# Patient Record
Sex: FEMALE | Race: WHITE | NOT HISPANIC OR LATINO
[De-identification: names, ages, dates, MRNs, and addresses within clinical notes are randomized per-mention and may not be internally consistent; named-entity substitution may affect disease eponyms.]

---

## 2017-04-05 ENCOUNTER — APPOINTMENT (OUTPATIENT)
Dept: ORTHOPEDIC SURGERY | Facility: CLINIC | Age: 67
End: 2017-04-05

## 2017-04-05 DIAGNOSIS — G14 POSTPOLIO SYNDROME: ICD-10-CM

## 2017-04-12 ENCOUNTER — RX RENEWAL (OUTPATIENT)
Age: 67
End: 2017-04-12

## 2017-10-24 ENCOUNTER — APPOINTMENT (OUTPATIENT)
Dept: OPHTHALMOLOGY | Facility: CLINIC | Age: 67
End: 2017-10-24
Payer: MEDICARE

## 2017-10-24 DIAGNOSIS — H52.4 PRESBYOPIA: ICD-10-CM

## 2017-10-24 DIAGNOSIS — H35.30 UNSPECIFIED MACULAR DEGENERATION: ICD-10-CM

## 2017-10-24 DIAGNOSIS — H52.203 UNSPECIFIED ASTIGMATISM, BILATERAL: ICD-10-CM

## 2017-10-24 DIAGNOSIS — H52.13 MYOPIA, BILATERAL: ICD-10-CM

## 2017-10-24 PROCEDURE — 76514 ECHO EXAM OF EYE THICKNESS: CPT

## 2017-10-24 PROCEDURE — 92136 OPHTHALMIC BIOMETRY: CPT

## 2017-10-24 PROCEDURE — 99204 OFFICE O/P NEW MOD 45 MIN: CPT

## 2017-10-24 PROCEDURE — 92134 CPTRZ OPH DX IMG PST SGM RTA: CPT

## 2017-11-03 NOTE — ASU PATIENT PROFILE, ADULT - VISION (WITH CORRECTIVE LENSES IF THE PATIENT USUALLY WEARS THEM):
Left eye blurry/Partially impaired: cannot see medication labels or newsprint, but can see obstacles in path, and the surrounding layout; can count fingers at arm's length

## 2017-11-03 NOTE — ASU PATIENT PROFILE, ADULT - PMH
Diverticulosis    Dyslipidemia    h/o Polio in childhood  RLE weakness, uses cane to ambulate  HTN (hypertension)    Hypothyroidism    Obesity

## 2017-11-03 NOTE — ASU PATIENT PROFILE, ADULT - PSH
H/O lumbar L5-L6 laminectomy  2003  H/O partial thyroidectomy    S/P bilateral salpingo-oophorectomy    secondary to ovarian cyst  S/P  Section  ,   S/P D&C  2004  s/p right knee arthroscopy for meniscus tear  ,   surgical repair of left leg growth plate  secondary to polio    surgical repair of right foot  secondary to polio  1960  umbilical Hernia repair with mesh  2010 H/O lumbar L5-L6 laminectomy  2003  H/O partial thyroidectomy    S/P bilateral salpingo-oophorectomy    secondary to ovarian cyst  S/P  Section  ,   S/P D&C  2004  s/p right knee arthroscopy for meniscus tear  ,   S/P TKR (total knee replacement)  Left  surgical repair of left leg growth plate  secondary to polio    surgical repair of right foot  secondary to polio  1960  umbilical Hernia repair with mesh  2010

## 2017-11-06 ENCOUNTER — OUTPATIENT (OUTPATIENT)
Dept: OUTPATIENT SERVICES | Facility: HOSPITAL | Age: 67
LOS: 1 days | End: 2017-11-06
Payer: MEDICARE

## 2017-11-06 ENCOUNTER — APPOINTMENT (OUTPATIENT)
Dept: OPHTHALMOLOGY | Facility: HOSPITAL | Age: 67
End: 2017-11-06
Payer: MEDICARE

## 2017-11-06 ENCOUNTER — TRANSCRIPTION ENCOUNTER (OUTPATIENT)
Age: 67
End: 2017-11-06

## 2017-11-06 VITALS
HEIGHT: 64 IN | DIASTOLIC BLOOD PRESSURE: 69 MMHG | HEART RATE: 88 BPM | WEIGHT: 213.41 LBS | RESPIRATION RATE: 19 BRPM | SYSTOLIC BLOOD PRESSURE: 131 MMHG | TEMPERATURE: 98 F

## 2017-11-06 VITALS
HEART RATE: 80 BPM | OXYGEN SATURATION: 96 % | DIASTOLIC BLOOD PRESSURE: 56 MMHG | SYSTOLIC BLOOD PRESSURE: 110 MMHG | RESPIRATION RATE: 17 BRPM

## 2017-11-06 DIAGNOSIS — H25.812 COMBINED FORMS OF AGE-RELATED CATARACT, LEFT EYE: ICD-10-CM

## 2017-11-06 DIAGNOSIS — Z96.659 PRESENCE OF UNSPECIFIED ARTIFICIAL KNEE JOINT: Chronic | ICD-10-CM

## 2017-11-06 PROCEDURE — 66984 XCAPSL CTRC RMVL W/O ECP: CPT | Mod: LT

## 2017-11-06 PROCEDURE — C1780: CPT

## 2017-11-06 NOTE — ASU DISCHARGE PLAN (ADULT/PEDIATRIC). - PT EDUC
Intraocular lens implant (IOL) , Eye shield instructions and eye kit given to patient/other (specify)/Implant card (specify)

## 2017-11-06 NOTE — ASU DISCHARGE PLAN (ADULT/PEDIATRIC). - SPECIAL INSTRUCTIONS
Remove patch after 3 hours.  Throw away the white pad underneath the clear shield.  Start the following DURING THE DAY ONLY (not at night when sleeping):   Ofloxacin 1 drop every 4 hours,   Ketorolac 1 drop 2 times a day, and   Prednisolone acetate (shake well before using!) 1 drop every 2 hours.    Wear clear shield when napping or sleeping at night.  Do not rub eye as there are no stitches in the eye!  No showering tonight.    Please call [phone] if you have any questions or if you have pain or vomiting despite taking Tylenol (after 4:30 PM-9am).  Please call [phone] or [phone] during the day (9am-4:30PM) if you have any questions or concerns or pain or vomiting despite taking Tylenol.

## 2017-11-07 ENCOUNTER — APPOINTMENT (OUTPATIENT)
Dept: OPHTHALMOLOGY | Facility: CLINIC | Age: 67
End: 2017-11-07
Payer: MEDICARE

## 2017-11-07 PROCEDURE — 99024 POSTOP FOLLOW-UP VISIT: CPT

## 2017-11-16 ENCOUNTER — APPOINTMENT (OUTPATIENT)
Dept: OPHTHALMOLOGY | Facility: CLINIC | Age: 67
End: 2017-11-16
Payer: MEDICARE

## 2017-11-16 PROCEDURE — 99024 POSTOP FOLLOW-UP VISIT: CPT

## 2017-11-30 ENCOUNTER — APPOINTMENT (OUTPATIENT)
Dept: OPHTHALMOLOGY | Facility: CLINIC | Age: 67
End: 2017-11-30
Payer: MEDICARE

## 2017-11-30 DIAGNOSIS — H26.492 OTHER SECONDARY CATARACT, LEFT EYE: ICD-10-CM

## 2017-11-30 PROCEDURE — 92134 CPTRZ OPH DX IMG PST SGM RTA: CPT

## 2017-11-30 PROCEDURE — 99024 POSTOP FOLLOW-UP VISIT: CPT

## 2018-01-26 ENCOUNTER — APPOINTMENT (OUTPATIENT)
Dept: OPHTHALMOLOGY | Facility: CLINIC | Age: 68
End: 2018-01-26
Payer: MEDICARE

## 2018-01-26 DIAGNOSIS — H25.12 AGE-RELATED NUCLEAR CATARACT, LEFT EYE: ICD-10-CM

## 2018-01-26 DIAGNOSIS — H35.30 UNSPECIFIED MACULAR DEGENERATION: ICD-10-CM

## 2018-01-26 PROCEDURE — 92134 CPTRZ OPH DX IMG PST SGM RTA: CPT

## 2018-01-26 PROCEDURE — 99024 POSTOP FOLLOW-UP VISIT: CPT

## 2018-01-26 PROCEDURE — 92015 DETERMINE REFRACTIVE STATE: CPT

## 2018-04-05 ENCOUNTER — APPOINTMENT (OUTPATIENT)
Dept: OBGYN | Facility: CLINIC | Age: 68
End: 2018-04-05
Payer: MEDICARE

## 2018-04-05 VITALS
HEIGHT: 63 IN | BODY MASS INDEX: 37.03 KG/M2 | WEIGHT: 209 LBS | SYSTOLIC BLOOD PRESSURE: 131 MMHG | DIASTOLIC BLOOD PRESSURE: 76 MMHG | HEART RATE: 78 BPM

## 2018-04-05 DIAGNOSIS — R92.2 INCONCLUSIVE MAMMOGRAM: ICD-10-CM

## 2018-04-05 PROCEDURE — 99397 PER PM REEVAL EST PAT 65+ YR: CPT

## 2018-04-10 ENCOUNTER — APPOINTMENT (OUTPATIENT)
Dept: ORTHOPEDIC SURGERY | Facility: CLINIC | Age: 68
End: 2018-04-10
Payer: MEDICARE

## 2018-04-10 PROCEDURE — 99213 OFFICE O/P EST LOW 20 MIN: CPT

## 2018-04-10 PROCEDURE — 73562 X-RAY EXAM OF KNEE 3: CPT | Mod: 50

## 2018-05-04 ENCOUNTER — OUTPATIENT (OUTPATIENT)
Dept: OUTPATIENT SERVICES | Facility: HOSPITAL | Age: 68
LOS: 1 days | End: 2018-05-04
Payer: MEDICARE

## 2018-05-04 ENCOUNTER — RESULT REVIEW (OUTPATIENT)
Age: 68
End: 2018-05-04

## 2018-05-04 DIAGNOSIS — Z96.659 PRESENCE OF UNSPECIFIED ARTIFICIAL KNEE JOINT: Chronic | ICD-10-CM

## 2018-05-04 DIAGNOSIS — K86.9 DISEASE OF PANCREAS, UNSPECIFIED: ICD-10-CM

## 2018-05-04 PROCEDURE — 43259 EGD US EXAM DUODENUM/JEJUNUM: CPT

## 2018-05-04 PROCEDURE — 43239 EGD BIOPSY SINGLE/MULTIPLE: CPT | Mod: XS

## 2018-05-04 PROCEDURE — 88305 TISSUE EXAM BY PATHOLOGIST: CPT | Mod: 26

## 2018-05-04 PROCEDURE — 88305 TISSUE EXAM BY PATHOLOGIST: CPT

## 2018-05-07 LAB — SURGICAL PATHOLOGY STUDY: SIGNIFICANT CHANGE UP

## 2018-07-26 ENCOUNTER — APPOINTMENT (OUTPATIENT)
Dept: OPHTHALMOLOGY | Facility: CLINIC | Age: 68
End: 2018-07-26
Payer: MEDICARE

## 2018-07-26 DIAGNOSIS — H40.003 PREGLAUCOMA, UNSPECIFIED, BILATERAL: ICD-10-CM

## 2018-07-26 PROCEDURE — 92012 INTRM OPH EXAM EST PATIENT: CPT

## 2018-07-26 PROCEDURE — 92250 FUNDUS PHOTOGRAPHY W/I&R: CPT

## 2018-07-26 PROCEDURE — 92083 EXTENDED VISUAL FIELD XM: CPT

## 2019-04-08 ENCOUNTER — APPOINTMENT (OUTPATIENT)
Dept: OBGYN | Facility: CLINIC | Age: 69
End: 2019-04-08
Payer: COMMERCIAL

## 2019-04-08 VITALS
SYSTOLIC BLOOD PRESSURE: 147 MMHG | DIASTOLIC BLOOD PRESSURE: 80 MMHG | WEIGHT: 195 LBS | BODY MASS INDEX: 34.55 KG/M2 | HEIGHT: 63 IN | HEART RATE: 84 BPM

## 2019-04-08 DIAGNOSIS — N63.0 UNSPECIFIED LUMP IN UNSPECIFIED BREAST: ICD-10-CM

## 2019-04-08 PROCEDURE — 99397 PER PM REEVAL EST PAT 65+ YR: CPT

## 2019-04-10 ENCOUNTER — APPOINTMENT (OUTPATIENT)
Dept: ORTHOPEDIC SURGERY | Facility: CLINIC | Age: 69
End: 2019-04-10
Payer: COMMERCIAL

## 2019-04-10 VITALS
DIASTOLIC BLOOD PRESSURE: 85 MMHG | HEART RATE: 65 BPM | SYSTOLIC BLOOD PRESSURE: 144 MMHG | WEIGHT: 195 LBS | BODY MASS INDEX: 34.55 KG/M2 | HEIGHT: 63 IN

## 2019-04-10 PROCEDURE — 99213 OFFICE O/P EST LOW 20 MIN: CPT

## 2019-04-10 PROCEDURE — 73562 X-RAY EXAM OF KNEE 3: CPT | Mod: LT

## 2019-07-19 ENCOUNTER — RESULT REVIEW (OUTPATIENT)
Age: 69
End: 2019-07-19

## 2019-07-19 ENCOUNTER — OUTPATIENT (OUTPATIENT)
Dept: OUTPATIENT SERVICES | Facility: HOSPITAL | Age: 69
LOS: 1 days | End: 2019-07-19
Payer: MEDICARE

## 2019-07-19 DIAGNOSIS — K86.3 PSEUDOCYST OF PANCREAS: ICD-10-CM

## 2019-07-19 DIAGNOSIS — Z96.659 PRESENCE OF UNSPECIFIED ARTIFICIAL KNEE JOINT: Chronic | ICD-10-CM

## 2019-07-19 LAB — AMYLASE FLD-CCNC: 1388 U/L — SIGNIFICANT CHANGE UP

## 2019-07-19 PROCEDURE — 82150 ASSAY OF AMYLASE: CPT

## 2019-07-19 PROCEDURE — 43242 EGD US FINE NEEDLE BX/ASPIR: CPT

## 2019-07-19 PROCEDURE — 82378 CARCINOEMBRYONIC ANTIGEN: CPT

## 2019-07-19 PROCEDURE — 88305 TISSUE EXAM BY PATHOLOGIST: CPT | Mod: 26

## 2019-07-19 PROCEDURE — 88305 TISSUE EXAM BY PATHOLOGIST: CPT

## 2019-07-19 PROCEDURE — 88173 CYTOPATH EVAL FNA REPORT: CPT | Mod: 26

## 2019-07-19 PROCEDURE — 88173 CYTOPATH EVAL FNA REPORT: CPT

## 2019-07-22 LAB
CEA FLD-MCNC: 14 NG/ML — SIGNIFICANT CHANGE UP
NON-GYNECOLOGICAL CYTOLOGY STUDY: SIGNIFICANT CHANGE UP
SPECIMEN SOURCE FLD: SIGNIFICANT CHANGE UP

## 2020-04-13 ENCOUNTER — APPOINTMENT (OUTPATIENT)
Dept: OBGYN | Facility: CLINIC | Age: 70
End: 2020-04-13

## 2020-06-22 ENCOUNTER — APPOINTMENT (OUTPATIENT)
Dept: OBGYN | Facility: CLINIC | Age: 70
End: 2020-06-22
Payer: MEDICARE

## 2020-06-22 VITALS
SYSTOLIC BLOOD PRESSURE: 132 MMHG | WEIGHT: 207.13 LBS | DIASTOLIC BLOOD PRESSURE: 82 MMHG | TEMPERATURE: 97.2 F | HEART RATE: 79 BPM | HEIGHT: 63 IN | BODY MASS INDEX: 36.7 KG/M2

## 2020-06-22 DIAGNOSIS — M85.80 OTHER SPECIFIED DISORDERS OF BONE DENSITY AND STRUCTURE, UNSPECIFIED SITE: ICD-10-CM

## 2020-06-22 DIAGNOSIS — N95.2 POSTMENOPAUSAL ATROPHIC VAGINITIS: ICD-10-CM

## 2020-06-22 DIAGNOSIS — R92.8 OTHER ABNORMAL AND INCONCLUSIVE FINDINGS ON DIAGNOSTIC IMAGING OF BREAST: ICD-10-CM

## 2020-06-22 PROCEDURE — 99397 PER PM REEVAL EST PAT 65+ YR: CPT

## 2020-06-22 NOTE — PHYSICAL EXAM
[Awake] : awake [Alert] : alert [Soft] : soft [Oriented x3] : oriented to person, place, and time [Vulvar Atrophy] : vulvar atrophy [Normal] : uterus [No Bleeding] : there was no active vaginal bleeding [Uterine Adnexae] : were not tender and not enlarged [Acute Distress] : no acute distress [Mass] : no breast mass [Nipple Discharge] : no nipple discharge [Axillary LAD] : no axillary lymphadenopathy [Tender] : non tender

## 2020-07-02 ENCOUNTER — TRANSCRIPTION ENCOUNTER (OUTPATIENT)
Age: 70
End: 2020-07-02

## 2020-07-08 ENCOUNTER — APPOINTMENT (OUTPATIENT)
Dept: ORTHOPEDIC SURGERY | Facility: CLINIC | Age: 70
End: 2020-07-08
Payer: MEDICARE

## 2020-07-08 VITALS — TEMPERATURE: 96.5 F

## 2020-07-08 DIAGNOSIS — A80.9 ACUTE POLIOMYELITIS, UNSPECIFIED: ICD-10-CM

## 2020-07-08 PROCEDURE — 73560 X-RAY EXAM OF KNEE 1 OR 2: CPT | Mod: RT

## 2020-07-08 PROCEDURE — 99213 OFFICE O/P EST LOW 20 MIN: CPT

## 2020-07-08 PROCEDURE — 73562 X-RAY EXAM OF KNEE 3: CPT | Mod: LT

## 2020-07-08 NOTE — HISTORY OF PRESENT ILLNESS
[de-identified] : 70 y/o female s/p L TKR 10/8/12 doing well.\par No pain or complaints about the left knee.\par She is managing well with her day-to-day activities.

## 2020-07-08 NOTE — DISCUSSION/SUMMARY
[de-identified] : Her total knee replacement is doing well.  She is managing very well with her right knee.  Return visit in 1 to 2 years.

## 2020-07-08 NOTE — PHYSICAL EXAM
[FreeTextEntry2] : Total knee replacement continues to do extremely well.  She has full extension she can flex it to about 115 degrees she has good medial lateral and posterior stability.  Her opposite right knee is on her polio leg here she has full extension and flexes 120 degrees she does have slight medial lateral jog of motion she however is managing at this time well and she would definitely like to stay on conservative measures.  \par \par  [de-identified] : Today this patient had 3 views done of her left total knee replacement and an AP was done of the right knee.  Her left total knee replacement shows excellent alignment position there is no evidence of loosening or ostial lysis and the patella tracks well.\par \par Her right knee is satisfactory aligned the patient does have polio affecting this leg this can be seen with the slightly smaller patella and she does have some medial lateral degenerative changes in the compartments but she is managing satisfactorily.

## 2020-11-03 ENCOUNTER — APPOINTMENT (OUTPATIENT)
Dept: THORACIC SURGERY | Facility: CLINIC | Age: 70
End: 2020-11-03
Payer: MEDICARE

## 2020-11-03 VITALS
HEIGHT: 63 IN | DIASTOLIC BLOOD PRESSURE: 85 MMHG | OXYGEN SATURATION: 98 % | HEART RATE: 89 BPM | WEIGHT: 200 LBS | SYSTOLIC BLOOD PRESSURE: 152 MMHG | BODY MASS INDEX: 35.44 KG/M2

## 2020-11-03 PROCEDURE — 99205 OFFICE O/P NEW HI 60 MIN: CPT

## 2020-11-03 PROCEDURE — 99072 ADDL SUPL MATRL&STAF TM PHE: CPT

## 2020-11-03 RX ORDER — DORZOLAMIDE HYDROCHLORIDE 20 MG/ML
2 SOLUTION OPHTHALMIC
Refills: 0 | Status: DISCONTINUED | COMMUNITY
End: 2020-11-03

## 2020-11-03 RX ORDER — OFLOXACIN 3 MG/ML
0.3 SOLUTION/ DROPS OPHTHALMIC 4 TIMES DAILY
Qty: 1 | Refills: 5 | Status: DISCONTINUED | COMMUNITY
Start: 2017-10-31 | End: 2020-11-03

## 2020-11-03 RX ORDER — VALSARTAN 80 MG/1
80 TABLET, COATED ORAL
Qty: 90 | Refills: 0 | Status: DISCONTINUED | COMMUNITY
Start: 2017-01-04 | End: 2020-11-03

## 2020-11-03 RX ORDER — VALSARTAN AND HYDROCHLOROTHIAZIDE 160; 25 MG/1; MG/1
160-25 TABLET, FILM COATED ORAL
Qty: 90 | Refills: 0 | Status: DISCONTINUED | COMMUNITY
Start: 2017-01-04 | End: 2020-11-03

## 2020-11-03 RX ORDER — PREDNISOLONE ACETATE 10 MG/ML
1 SUSPENSION/ DROPS OPHTHALMIC
Qty: 1 | Refills: 3 | Status: DISCONTINUED | COMMUNITY
Start: 2017-10-31 | End: 2020-11-03

## 2020-11-03 NOTE — HISTORY OF PRESENT ILLNESS
[FreeTextEntry1] : Ms. Gaona is a 69 year old female who presents today for evaluation, referred by pulmonologist Dr. Arslan Larios. She is a former smoker (20 years, 1.5 PPD, Quit 1992) w/ hx of HTN, poliomyelitis, and partial thyroidectomy.  She was found to have lung nodules on CT Abdomen during work-up for diverticulitis many years ago, which have been followed by serial CT Chest scans with Dr. Denis Soriano.  Of note, due to a recent insurance plan change, she has changed pulmonologist and now follows with Dr. Larios. \par \par CT Chest on 09/04/2020 reveals: \par - Left apical 2.1 x 1.2 cm semisolid nodule, increased in size since prior study\par - Adjacent 6 mm semisolid nodule (4: 26), unchanged\par - RML 0.8 cm groundglass nodule (4: 55), unchanged\par - LLL 5 mm nodule (4: 77), also unchanged\par - Additional scattered bilateral groundglass nodules\par - 1.5 cm (3: 56) low-attenuation lesion in the pancreatic head \par \par PFTs on 10/23/2020 reveals:\par - FEV1: 2.38 (113%)\par - DLCO: 22.23 (109%)\par \par She denies any fever, chills, cough, shortness of breath, chest pain, hemoptysis, or recent illness. Of note, she uses an assistive device for walking due to history of polio.

## 2020-11-03 NOTE — PHYSICAL EXAM
[General Appearance - Alert] : alert [General Appearance - In No Acute Distress] : in no acute distress [Sclera] : the sclera and conjunctiva were normal [Outer Ear] : the ears and nose were normal in appearance [Hearing Threshold Finger Rub Not [place]] : hearing was normal [Neck Appearance] : the appearance of the neck was normal [] : no respiratory distress [Respiration, Rhythm And Depth] : normal respiratory rhythm and effort [Auscultation Breath Sounds / Voice Sounds] : lungs were clear to auscultation bilaterally [Heart Sounds] : normal S1 and S2 [Murmurs] : no murmurs [Examination Of The Chest] : the chest was normal in appearance [Abdomen Soft] : soft [Abdomen Tenderness] : non-tender [Cervical Lymph Nodes Enlarged Posterior Bilaterally] : posterior cervical [Cervical Lymph Nodes Enlarged Anterior Bilaterally] : anterior cervical [Supraclavicular Lymph Nodes Enlarged Bilaterally] : supraclavicular [Skin Color & Pigmentation] : normal skin color and pigmentation [Skin Turgor] : normal skin turgor [No Focal Deficits] : no focal deficits [Oriented To Time, Place, And Person] : oriented to person, place, and time [Affect] : the affect was normal [Mood] : the mood was normal [FreeTextEntry1] : walks with one crutch

## 2020-11-03 NOTE — CONSULT LETTER
[Consult Letter:] : I had the pleasure of evaluating your patient, [unfilled]. [( Thank you for referring [unfilled] for consultation for _____ )] : Thank you for referring [unfilled] for consultation for [unfilled] [Please see my note below.] : Please see my note below. [Consult Closing:] : Thank you very much for allowing me to participate in the care of this patient.  If you have any questions, please do not hesitate to contact me. [Sincerely,] : Sincerely, [Dear  ___] : Dear  [unfilled], [FreeTextEntry2] : Dr. Arslan Larios (Pulm/Ref) [FreeTextEntry3] : North Salmon MD, FACS \par Chief, Division of Thoracic Surgery \par Director, Minimally Invasive Thoracic Surgery \par Department of Cardiovascular and Thoracic Surgery \par [facility] \par , Cardiovascular and Thoracic Surgery\par \par

## 2020-11-03 NOTE — ASSESSMENT
[FreeTextEntry1] : 69 year old female, evaluation of lung nodule, ref: Dr. Larios. Former smoker, found to have lung nodules on CT Abd which have been followed by serial CT Chest scans over the last few years.\par \par CT Chest on 09/04/2020 reveals: \par - Left apical 2.1 x 1.2 cm semisolid nodule, increased in size since prior study\par - Adjacent 6 mm semisolid nodule (4: 26), unchanged\par - RML 0.8 cm groundglass nodule (4: 55), unchanged\par - LLL 5 mm nodule (4: 77), also unchanged\par - Additional scattered bilateral groundglass nodules\par - 1.5 cm (3: 56) low-attenuation lesion in the pancreatic head \par \par PFTs on 10/23/2020 reveals: FEV1: 2.38 (113%), DLCO: 22.23 (109%).\par \par I have reviewed the patient's medical records and diagnostic images during the time of this office visit, and I have made the following recommendation:\par 1.  Flexible Bronchoscopy, Left VATS, Lung Resection\par 2.  Patient enrolled in clinical trial [facility] STUDY with [facility] 3. Consent in the chart.\par 3.  The risks, benefits, and alternatives to the procedure were discussed with the patient at length. She verbalized understanding and is in agreement with the above treatment plan.\par 4.  Prior to the above procedure, I have asked her to obtain cardiac clearance.\par \par \par I personally performed the services described in the documentation, reviewed the documentation recorded by the scribe in my presence and it accurately and completely records my words and actions.\par \par I, Marilee Hummel, am scribing for and the presence of ALDEN Cast the following sections HISTORY OF PRESENT ILLNESSES, PAST MEDICAL/FAMILY/SOCIAL HISTORY; REVIEW OF SYSTEMS; VITAL SIGNS; PHYSICAL EXAM; DISPOSITION.

## 2020-11-11 RX ORDER — KETOROLAC TROMETHAMINE 4 MG/ML
0.4 SOLUTION/ DROPS OPHTHALMIC TWICE DAILY
Qty: 1 | Refills: 3 | Status: DISCONTINUED | COMMUNITY
Start: 2017-10-31 | End: 2020-11-11

## 2020-11-11 RX ORDER — LOSARTAN POTASSIUM AND HYDROCHLOROTHIAZIDE 25; 100 MG/1; MG/1
100-25 TABLET ORAL DAILY
Refills: 0 | Status: ACTIVE | COMMUNITY

## 2020-11-11 RX ORDER — CALCIUM CARBONATE/VITAMIN D3 600 MG-10
TABLET ORAL DAILY
Refills: 0 | Status: ACTIVE | COMMUNITY

## 2020-11-18 ENCOUNTER — OUTPATIENT (OUTPATIENT)
Dept: OUTPATIENT SERVICES | Facility: HOSPITAL | Age: 70
LOS: 1 days | End: 2020-11-18
Payer: MEDICARE

## 2020-11-18 ENCOUNTER — APPOINTMENT (OUTPATIENT)
Dept: CARDIOLOGY | Facility: CLINIC | Age: 70
End: 2020-11-18
Payer: MEDICARE

## 2020-11-18 ENCOUNTER — NON-APPOINTMENT (OUTPATIENT)
Age: 70
End: 2020-11-18

## 2020-11-18 ENCOUNTER — APPOINTMENT (OUTPATIENT)
Dept: CV DIAGNOSITCS | Facility: HOSPITAL | Age: 70
End: 2020-11-18

## 2020-11-18 VITALS
TEMPERATURE: 98 F | DIASTOLIC BLOOD PRESSURE: 83 MMHG | RESPIRATION RATE: 16 BRPM | BODY MASS INDEX: 37.38 KG/M2 | HEART RATE: 91 BPM | SYSTOLIC BLOOD PRESSURE: 149 MMHG | WEIGHT: 211 LBS | OXYGEN SATURATION: 98 %

## 2020-11-18 DIAGNOSIS — Z96.659 PRESENCE OF UNSPECIFIED ARTIFICIAL KNEE JOINT: Chronic | ICD-10-CM

## 2020-11-18 DIAGNOSIS — R06.00 DYSPNEA, UNSPECIFIED: ICD-10-CM

## 2020-11-18 PROCEDURE — 93306 TTE W/DOPPLER COMPLETE: CPT | Mod: 26

## 2020-11-18 PROCEDURE — 99205 OFFICE O/P NEW HI 60 MIN: CPT

## 2020-11-18 PROCEDURE — 93000 ELECTROCARDIOGRAM COMPLETE: CPT

## 2020-11-23 ENCOUNTER — APPOINTMENT (OUTPATIENT)
Dept: INTERNAL MEDICINE | Facility: CLINIC | Age: 70
End: 2020-11-23

## 2020-11-25 ENCOUNTER — OUTPATIENT (OUTPATIENT)
Dept: OUTPATIENT SERVICES | Facility: HOSPITAL | Age: 70
LOS: 1 days | End: 2020-11-25

## 2020-11-25 VITALS
HEART RATE: 83 BPM | OXYGEN SATURATION: 99 % | RESPIRATION RATE: 16 BRPM | HEIGHT: 63 IN | DIASTOLIC BLOOD PRESSURE: 80 MMHG | TEMPERATURE: 98 F | SYSTOLIC BLOOD PRESSURE: 158 MMHG | WEIGHT: 210.1 LBS

## 2020-11-25 DIAGNOSIS — I10 ESSENTIAL (PRIMARY) HYPERTENSION: ICD-10-CM

## 2020-11-25 DIAGNOSIS — R91.1 SOLITARY PULMONARY NODULE: ICD-10-CM

## 2020-11-25 DIAGNOSIS — Z98.49 CATARACT EXTRACTION STATUS, UNSPECIFIED EYE: Chronic | ICD-10-CM

## 2020-11-25 DIAGNOSIS — E03.9 HYPOTHYROIDISM, UNSPECIFIED: ICD-10-CM

## 2020-11-25 DIAGNOSIS — Z96.659 PRESENCE OF UNSPECIFIED ARTIFICIAL KNEE JOINT: Chronic | ICD-10-CM

## 2020-11-25 LAB
ANION GAP SERPL CALC-SCNC: 13 MMO/L — SIGNIFICANT CHANGE UP (ref 7–14)
BLD GP AB SCN SERPL QL: NEGATIVE — SIGNIFICANT CHANGE UP
BUN SERPL-MCNC: 8 MG/DL — SIGNIFICANT CHANGE UP (ref 7–23)
CALCIUM SERPL-MCNC: 9.5 MG/DL — SIGNIFICANT CHANGE UP (ref 8.4–10.5)
CHLORIDE SERPL-SCNC: 97 MMOL/L — LOW (ref 98–107)
CO2 SERPL-SCNC: 27 MMOL/L — SIGNIFICANT CHANGE UP (ref 22–31)
CREAT SERPL-MCNC: 0.45 MG/DL — LOW (ref 0.5–1.3)
GLUCOSE SERPL-MCNC: 117 MG/DL — HIGH (ref 70–99)
HCT VFR BLD CALC: 47.3 % — HIGH (ref 34.5–45)
HGB BLD-MCNC: 15.3 G/DL — SIGNIFICANT CHANGE UP (ref 11.5–15.5)
MCHC RBC-ENTMCNC: 28.9 PG — SIGNIFICANT CHANGE UP (ref 27–34)
MCHC RBC-ENTMCNC: 32.3 % — SIGNIFICANT CHANGE UP (ref 32–36)
MCV RBC AUTO: 89.2 FL — SIGNIFICANT CHANGE UP (ref 80–100)
NRBC # FLD: 0 K/UL — SIGNIFICANT CHANGE UP (ref 0–0)
PLATELET # BLD AUTO: 244 K/UL — SIGNIFICANT CHANGE UP (ref 150–400)
PMV BLD: 8.4 FL — SIGNIFICANT CHANGE UP (ref 7–13)
POTASSIUM SERPL-MCNC: 3.8 MMOL/L — SIGNIFICANT CHANGE UP (ref 3.5–5.3)
POTASSIUM SERPL-SCNC: 3.8 MMOL/L — SIGNIFICANT CHANGE UP (ref 3.5–5.3)
RBC # BLD: 5.3 M/UL — HIGH (ref 3.8–5.2)
RBC # FLD: 13 % — SIGNIFICANT CHANGE UP (ref 10.3–14.5)
RH IG SCN BLD-IMP: POSITIVE — SIGNIFICANT CHANGE UP
SODIUM SERPL-SCNC: 137 MMOL/L — SIGNIFICANT CHANGE UP (ref 135–145)
WBC # BLD: 6.33 K/UL — SIGNIFICANT CHANGE UP (ref 3.8–10.5)
WBC # FLD AUTO: 6.33 K/UL — SIGNIFICANT CHANGE UP (ref 3.8–10.5)

## 2020-11-25 NOTE — H&P PST ADULT - NSICDXPASTMEDICALHX_GEN_ALL_CORE_FT
PAST MEDICAL HISTORY:  Arthritis     Chronic gout     Diverticulosis     Dyslipidemia     h/o Polio in childhood RLE weakness, uses cane to ambulate    History of macular degeneration     HTN (hypertension)     Hypothyroidism     Obesity     S/P lumbar laminectomy     Solitary pulmonary nodule     Spinal stenosis     Spondylolisthesis

## 2020-11-25 NOTE — H&P PST ADULT - NEGATIVE OPHTHALMOLOGIC SYMPTOMS
no pain L/no blurred vision R/no blurred vision L/no diplopia/no photophobia/no loss of vision R/no pain R/no loss of vision L

## 2020-11-25 NOTE — H&P PST ADULT - NEGATIVE ENMT SYMPTOMS
no tinnitus/no sinus symptoms/no nose bleeds/no throat pain/no hearing difficulty/no vertigo/no dysphagia/no ear pain

## 2020-11-25 NOTE — H&P PST ADULT - RS GEN PE MLT RESP DETAILS PC
breath sounds equal/no wheezes/good air movement/clear to auscultation bilaterally/no rales/airway patent/respirations non-labored/no rhonchi

## 2020-11-25 NOTE — H&P PST ADULT - NSICDXPASTSURGICALHX_GEN_ALL_CORE_FT
PAST SURGICAL HISTORY:  H/O lumbar L5-L6 laminectomy 2003    H/O partial thyroidectomy     S/P bilateral salpingo-oophorectomy   secondary to ovarian cyst    S/P cataract surgery 2014    S/P  Section ,     S/P D&C 2004    s/p right knee arthroscopy for meniscus tear ,     S/P TKR (total knee replacement) Left     surgical repair of left leg growth plate secondary to polio      surgical repair of right foot secondary to polio      umbilical Hernia repair with mesh 2010

## 2020-11-25 NOTE — H&P PST ADULT - NSICDXPROBLEM_GEN_ALL_CORE_FT
PROBLEM DIAGNOSES  Problem: Solitary pulmonary nodule  Assessment and Plan: Pt is tentatively scheduled for flexible bronchoscopy left video assisted thoracoscopy lung resection for 12/3/20. Pre-op instructions provided. Pt given verbal and written instructions with teach back on chlorhexidine shampoo and pepcid. Pt has a scheduled preop COVID test. Pt verbalized understanding with return demonstration.   SHANA precautions, OR booking notified   Cardiology evaluation in chart with ekg and echo     Problem: Hypothyroidism  Assessment and Plan: Patient instructed to take levothyroxine with a sip of water on the morning of procedure.     Problem: HTN (hypertension)  Assessment and Plan: Patient instructed to take atenolol amlodipine and losartan/HCTZ with a sip of water on the morning of procedure.

## 2020-11-25 NOTE — H&P PST ADULT - HISTORY OF PRESENT ILLNESS
70 year old female h/o HTN HLD poliomyelitis presents to presurgical testing with diagnosis of solitary pulmonary nodule scheduled for flexible bronchoscopy left video assisted thoracoscopy lung resection. Pt with increasing lung nodule on surveillance CT scan. Pt denies SOB, cough, or unintentional weight loss.

## 2020-11-25 NOTE — H&P PST ADULT - MUSCULOSKELETAL
details… detailed exam no joint swelling/no joint erythema/normal strength/no joint warmth/no calf tenderness/ROM intact no joint swelling/no joint erythema/no calf tenderness/no joint warmth/diminished strength/ROM intact

## 2020-11-28 DIAGNOSIS — Z01.818 ENCOUNTER FOR OTHER PREPROCEDURAL EXAMINATION: ICD-10-CM

## 2020-11-30 ENCOUNTER — APPOINTMENT (OUTPATIENT)
Dept: DISASTER EMERGENCY | Facility: CLINIC | Age: 70
End: 2020-11-30

## 2020-12-01 LAB — SARS-COV-2 N GENE NPH QL NAA+PROBE: NOT DETECTED

## 2020-12-03 ENCOUNTER — APPOINTMENT (OUTPATIENT)
Dept: THORACIC SURGERY | Facility: HOSPITAL | Age: 70
End: 2020-12-03

## 2020-12-03 ENCOUNTER — TRANSCRIPTION ENCOUNTER (OUTPATIENT)
Age: 70
End: 2020-12-03

## 2020-12-03 ENCOUNTER — INPATIENT (INPATIENT)
Facility: HOSPITAL | Age: 70
LOS: 0 days | Discharge: HOME | End: 2020-12-04
Attending: THORACIC SURGERY (CARDIOTHORACIC VASCULAR SURGERY) | Admitting: THORACIC SURGERY (CARDIOTHORACIC VASCULAR SURGERY)
Payer: MEDICARE

## 2020-12-03 ENCOUNTER — RESULT REVIEW (OUTPATIENT)
Age: 70
End: 2020-12-03

## 2020-12-03 VITALS
HEART RATE: 76 BPM | WEIGHT: 210.1 LBS | HEIGHT: 63 IN | DIASTOLIC BLOOD PRESSURE: 63 MMHG | SYSTOLIC BLOOD PRESSURE: 142 MMHG | RESPIRATION RATE: 16 BRPM | OXYGEN SATURATION: 98 % | TEMPERATURE: 98 F

## 2020-12-03 DIAGNOSIS — Z98.49 CATARACT EXTRACTION STATUS, UNSPECIFIED EYE: Chronic | ICD-10-CM

## 2020-12-03 DIAGNOSIS — Z96.659 PRESENCE OF UNSPECIFIED ARTIFICIAL KNEE JOINT: Chronic | ICD-10-CM

## 2020-12-03 DIAGNOSIS — R91.1 SOLITARY PULMONARY NODULE: ICD-10-CM

## 2020-12-03 LAB — RH IG SCN BLD-IMP: POSITIVE — SIGNIFICANT CHANGE UP

## 2020-12-03 PROCEDURE — 88331 PATH CONSLTJ SURG 1 BLK 1SPC: CPT | Mod: 26

## 2020-12-03 PROCEDURE — 32674 THORACOSCOPY LYMPH NODE EXC: CPT

## 2020-12-03 PROCEDURE — 88305 TISSUE EXAM BY PATHOLOGIST: CPT | Mod: 26

## 2020-12-03 PROCEDURE — 88307 TISSUE EXAM BY PATHOLOGIST: CPT | Mod: 26

## 2020-12-03 PROCEDURE — 32663 THORACOSCOPY W/LOBECTOMY: CPT | Mod: LT

## 2020-12-03 PROCEDURE — 88309 TISSUE EXAM BY PATHOLOGIST: CPT | Mod: 26

## 2020-12-03 PROCEDURE — 32668 THORACOSCOPY W/W RESECT DIAG: CPT

## 2020-12-03 PROCEDURE — 71045 X-RAY EXAM CHEST 1 VIEW: CPT | Mod: 26

## 2020-12-03 PROCEDURE — 88313 SPECIAL STAINS GROUP 2: CPT | Mod: 26

## 2020-12-03 RX ORDER — HEPARIN SODIUM 5000 [USP'U]/ML
5000 INJECTION INTRAVENOUS; SUBCUTANEOUS EVERY 8 HOURS
Refills: 0 | Status: DISCONTINUED | OUTPATIENT
Start: 2020-12-03 | End: 2020-12-04

## 2020-12-03 RX ORDER — ACETAMINOPHEN 500 MG
975 TABLET ORAL EVERY 6 HOURS
Refills: 0 | Status: DISCONTINUED | OUTPATIENT
Start: 2020-12-03 | End: 2020-12-04

## 2020-12-03 RX ORDER — HEPARIN SODIUM 5000 [USP'U]/ML
5000 INJECTION INTRAVENOUS; SUBCUTANEOUS ONCE
Refills: 0 | Status: COMPLETED | OUTPATIENT
Start: 2020-12-03 | End: 2020-12-03

## 2020-12-03 RX ORDER — ATORVASTATIN CALCIUM 80 MG/1
20 TABLET, FILM COATED ORAL AT BEDTIME
Refills: 0 | Status: DISCONTINUED | OUTPATIENT
Start: 2020-12-03 | End: 2020-12-04

## 2020-12-03 RX ORDER — LEVOTHYROXINE SODIUM 125 MCG
100 TABLET ORAL DAILY
Refills: 0 | Status: DISCONTINUED | OUTPATIENT
Start: 2020-12-03 | End: 2020-12-04

## 2020-12-03 RX ORDER — ONDANSETRON 8 MG/1
4 TABLET, FILM COATED ORAL ONCE
Refills: 0 | Status: DISCONTINUED | OUTPATIENT
Start: 2020-12-03 | End: 2020-12-03

## 2020-12-03 RX ORDER — ONDANSETRON 8 MG/1
4 TABLET, FILM COATED ORAL EVERY 6 HOURS
Refills: 0 | Status: DISCONTINUED | OUTPATIENT
Start: 2020-12-03 | End: 2020-12-04

## 2020-12-03 RX ORDER — ACETAMINOPHEN 500 MG
975 TABLET ORAL ONCE
Refills: 0 | Status: COMPLETED | OUTPATIENT
Start: 2020-12-03 | End: 2020-12-03

## 2020-12-03 RX ORDER — LOSARTAN POTASSIUM 100 MG/1
100 TABLET, FILM COATED ORAL DAILY
Refills: 0 | Status: DISCONTINUED | OUTPATIENT
Start: 2020-12-03 | End: 2020-12-04

## 2020-12-03 RX ORDER — HYDROMORPHONE HYDROCHLORIDE 2 MG/ML
0.5 INJECTION INTRAMUSCULAR; INTRAVENOUS; SUBCUTANEOUS
Refills: 0 | Status: DISCONTINUED | OUTPATIENT
Start: 2020-12-03 | End: 2020-12-04

## 2020-12-03 RX ORDER — SENNA PLUS 8.6 MG/1
2 TABLET ORAL AT BEDTIME
Refills: 0 | Status: DISCONTINUED | OUTPATIENT
Start: 2020-12-03 | End: 2020-12-04

## 2020-12-03 RX ORDER — MULTIVIT-MIN/FERROUS GLUCONATE 9 MG/15 ML
1 LIQUID (ML) ORAL DAILY
Refills: 0 | Status: DISCONTINUED | OUTPATIENT
Start: 2020-12-03 | End: 2020-12-04

## 2020-12-03 RX ORDER — SODIUM CHLORIDE 9 MG/ML
1000 INJECTION, SOLUTION INTRAVENOUS
Refills: 0 | Status: DISCONTINUED | OUTPATIENT
Start: 2020-12-03 | End: 2020-12-04

## 2020-12-03 RX ORDER — HYDROMORPHONE HYDROCHLORIDE 2 MG/ML
30 INJECTION INTRAMUSCULAR; INTRAVENOUS; SUBCUTANEOUS
Refills: 0 | Status: DISCONTINUED | OUTPATIENT
Start: 2020-12-03 | End: 2020-12-04

## 2020-12-03 RX ORDER — HYDROCHLOROTHIAZIDE 25 MG
25 TABLET ORAL DAILY
Refills: 0 | Status: DISCONTINUED | OUTPATIENT
Start: 2020-12-03 | End: 2020-12-04

## 2020-12-03 RX ORDER — ATENOLOL 25 MG/1
75 TABLET ORAL DAILY
Refills: 0 | Status: DISCONTINUED | OUTPATIENT
Start: 2020-12-03 | End: 2020-12-04

## 2020-12-03 RX ORDER — NALOXONE HYDROCHLORIDE 4 MG/.1ML
0.1 SPRAY NASAL
Refills: 0 | Status: DISCONTINUED | OUTPATIENT
Start: 2020-12-03 | End: 2020-12-04

## 2020-12-03 RX ORDER — GABAPENTIN 400 MG/1
100 CAPSULE ORAL ONCE
Refills: 0 | Status: COMPLETED | OUTPATIENT
Start: 2020-12-03 | End: 2020-12-03

## 2020-12-03 RX ADMIN — Medication 975 MILLIGRAM(S): at 17:10

## 2020-12-03 RX ADMIN — HYDROMORPHONE HYDROCHLORIDE 30 MILLILITER(S): 2 INJECTION INTRAMUSCULAR; INTRAVENOUS; SUBCUTANEOUS at 22:17

## 2020-12-03 RX ADMIN — SODIUM CHLORIDE 30 MILLILITER(S): 9 INJECTION, SOLUTION INTRAVENOUS at 06:43

## 2020-12-03 RX ADMIN — HEPARIN SODIUM 5000 UNIT(S): 5000 INJECTION INTRAVENOUS; SUBCUTANEOUS at 17:10

## 2020-12-03 RX ADMIN — ATORVASTATIN CALCIUM 20 MILLIGRAM(S): 80 TABLET, FILM COATED ORAL at 21:51

## 2020-12-03 RX ADMIN — GABAPENTIN 100 MILLIGRAM(S): 400 CAPSULE ORAL at 06:44

## 2020-12-03 RX ADMIN — SENNA PLUS 2 TABLET(S): 8.6 TABLET ORAL at 21:51

## 2020-12-03 RX ADMIN — Medication 975 MILLIGRAM(S): at 06:43

## 2020-12-03 RX ADMIN — HYDROMORPHONE HYDROCHLORIDE 30 MILLILITER(S): 2 INJECTION INTRAMUSCULAR; INTRAVENOUS; SUBCUTANEOUS at 10:47

## 2020-12-03 RX ADMIN — HEPARIN SODIUM 5000 UNIT(S): 5000 INJECTION INTRAVENOUS; SUBCUTANEOUS at 06:44

## 2020-12-03 RX ADMIN — Medication 975 MILLIGRAM(S): at 23:09

## 2020-12-03 RX ADMIN — ATENOLOL 75 MILLIGRAM(S): 25 TABLET ORAL at 23:09

## 2020-12-03 NOTE — BRIEF OPERATIVE NOTE - OPERATION/FINDINGS
Flexible bronchoscopy, LAINE wedge resection. Frozen: adenocarcinoma. Completion left upper lingula sparing lobectomy performed with mediastinal lymph node dissection of level 5 and 7

## 2020-12-03 NOTE — ASU PREOP CHECKLIST - PATIENT'S PERSONAL PROPERTY GIVEN TO
on unit/PACU locker #,  crutch x1 near spot 3 in ASU PACU locker #11, crutch x1 near spot 3 in ASU/on unit PACU locker #11, crutch x1 near spot 4 in ASU/on unit

## 2020-12-04 ENCOUNTER — TRANSCRIPTION ENCOUNTER (OUTPATIENT)
Age: 70
End: 2020-12-04

## 2020-12-04 VITALS
OXYGEN SATURATION: 99 % | DIASTOLIC BLOOD PRESSURE: 54 MMHG | RESPIRATION RATE: 18 BRPM | SYSTOLIC BLOOD PRESSURE: 120 MMHG | HEART RATE: 78 BPM | TEMPERATURE: 98 F

## 2020-12-04 LAB
ANION GAP SERPL CALC-SCNC: 12 MMO/L — SIGNIFICANT CHANGE UP (ref 7–14)
BUN SERPL-MCNC: 10 MG/DL — SIGNIFICANT CHANGE UP (ref 7–23)
CALCIUM SERPL-MCNC: 9.1 MG/DL — SIGNIFICANT CHANGE UP (ref 8.4–10.5)
CHLORIDE SERPL-SCNC: 94 MMOL/L — LOW (ref 98–107)
CO2 SERPL-SCNC: 28 MMOL/L — SIGNIFICANT CHANGE UP (ref 22–31)
CREAT SERPL-MCNC: 0.42 MG/DL — LOW (ref 0.5–1.3)
GLUCOSE SERPL-MCNC: 128 MG/DL — HIGH (ref 70–99)
HCT VFR BLD CALC: 40.8 % — SIGNIFICANT CHANGE UP (ref 34.5–45)
HGB BLD-MCNC: 13.8 G/DL — SIGNIFICANT CHANGE UP (ref 11.5–15.5)
MCHC RBC-ENTMCNC: 29.4 PG — SIGNIFICANT CHANGE UP (ref 27–34)
MCHC RBC-ENTMCNC: 33.8 % — SIGNIFICANT CHANGE UP (ref 32–36)
MCV RBC AUTO: 87 FL — SIGNIFICANT CHANGE UP (ref 80–100)
NRBC # FLD: 0 K/UL — SIGNIFICANT CHANGE UP (ref 0–0)
PLATELET # BLD AUTO: 211 K/UL — SIGNIFICANT CHANGE UP (ref 150–400)
PMV BLD: 8.5 FL — SIGNIFICANT CHANGE UP (ref 7–13)
POTASSIUM SERPL-MCNC: 3.4 MMOL/L — LOW (ref 3.5–5.3)
POTASSIUM SERPL-SCNC: 3.4 MMOL/L — LOW (ref 3.5–5.3)
RBC # BLD: 4.69 M/UL — SIGNIFICANT CHANGE UP (ref 3.8–5.2)
RBC # FLD: 13.2 % — SIGNIFICANT CHANGE UP (ref 10.3–14.5)
SODIUM SERPL-SCNC: 134 MMOL/L — LOW (ref 135–145)
WBC # BLD: 12.34 K/UL — HIGH (ref 3.8–10.5)
WBC # FLD AUTO: 12.34 K/UL — HIGH (ref 3.8–10.5)

## 2020-12-04 PROCEDURE — 71045 X-RAY EXAM CHEST 1 VIEW: CPT | Mod: 26

## 2020-12-04 RX ORDER — SENNA PLUS 8.6 MG/1
2 TABLET ORAL
Qty: 0 | Refills: 0 | DISCHARGE
Start: 2020-12-04

## 2020-12-04 RX ORDER — OXYCODONE HYDROCHLORIDE 5 MG/1
5 TABLET ORAL EVERY 4 HOURS
Refills: 0 | Status: DISCONTINUED | OUTPATIENT
Start: 2020-12-04 | End: 2020-12-04

## 2020-12-04 RX ORDER — OXYCODONE HYDROCHLORIDE 5 MG/1
10 TABLET ORAL EVERY 4 HOURS
Refills: 0 | Status: DISCONTINUED | OUTPATIENT
Start: 2020-12-04 | End: 2020-12-04

## 2020-12-04 RX ORDER — OMEGA-3 ACID ETHYL ESTERS 1 G
4 CAPSULE ORAL
Qty: 0 | Refills: 0 | DISCHARGE

## 2020-12-04 RX ORDER — MULTIVIT-MIN/FERROUS GLUCONATE 9 MG/15 ML
1 LIQUID (ML) ORAL
Qty: 0 | Refills: 0 | DISCHARGE

## 2020-12-04 RX ORDER — INFLUENZA VIRUS VACCINE 15; 15; 15; 15 UG/.5ML; UG/.5ML; UG/.5ML; UG/.5ML
0.5 SUSPENSION INTRAMUSCULAR ONCE
Refills: 0 | Status: DISCONTINUED | OUTPATIENT
Start: 2020-12-04 | End: 2020-12-04

## 2020-12-04 RX ORDER — POTASSIUM CHLORIDE 20 MEQ
40 PACKET (EA) ORAL ONCE
Refills: 0 | Status: COMPLETED | OUTPATIENT
Start: 2020-12-04 | End: 2020-12-04

## 2020-12-04 RX ADMIN — SODIUM CHLORIDE 30 MILLILITER(S): 9 INJECTION, SOLUTION INTRAVENOUS at 06:18

## 2020-12-04 RX ADMIN — Medication 40 MILLIEQUIVALENT(S): at 13:05

## 2020-12-04 RX ADMIN — HEPARIN SODIUM 5000 UNIT(S): 5000 INJECTION INTRAVENOUS; SUBCUTANEOUS at 08:37

## 2020-12-04 RX ADMIN — Medication 975 MILLIGRAM(S): at 06:17

## 2020-12-04 RX ADMIN — Medication 100 MICROGRAM(S): at 06:18

## 2020-12-04 RX ADMIN — Medication 975 MILLIGRAM(S): at 13:06

## 2020-12-04 RX ADMIN — LOSARTAN POTASSIUM 100 MILLIGRAM(S): 100 TABLET, FILM COATED ORAL at 06:17

## 2020-12-04 RX ADMIN — HEPARIN SODIUM 5000 UNIT(S): 5000 INJECTION INTRAVENOUS; SUBCUTANEOUS at 01:19

## 2020-12-04 RX ADMIN — Medication 975 MILLIGRAM(S): at 07:15

## 2020-12-04 RX ADMIN — Medication 975 MILLIGRAM(S): at 00:00

## 2020-12-04 RX ADMIN — HYDROMORPHONE HYDROCHLORIDE 30 MILLILITER(S): 2 INJECTION INTRAMUSCULAR; INTRAVENOUS; SUBCUTANEOUS at 08:31

## 2020-12-04 RX ADMIN — Medication 975 MILLIGRAM(S): at 13:36

## 2020-12-04 NOTE — PROGRESS NOTE ADULT - SUBJECTIVE AND OBJECTIVE BOX
Anesthesia Pain Management Service    SUBJECTIVE: Pt now off IV PCA without problems reported.    Therapy:	  [ X] IV PCA	   [ ] Epidural           [ ] s/p Spinal Opoid              [ ] Postpartum infusion	  [ ] Patient controlled regional anesthesia (PCRA)    [ ] prn Analgesics    Allergies    No Known Allergies    Intolerances      MEDICATIONS  (STANDING):  acetaminophen   Tablet .. 975 milliGRAM(s) Oral every 6 hours  atorvastatin 20 milliGRAM(s) Oral at bedtime  heparin   Injectable 5000 Unit(s) SubCutaneous every 8 hours  levothyroxine 100 MICROGram(s) Oral daily  losartan 100 milliGRAM(s) Oral daily  multivitamin/minerals 1 Tablet(s) Oral daily  senna 2 Tablet(s) Oral at bedtime    MEDICATIONS  (PRN):  artificial  tears Solution 1 Drop(s) Both EYES daily PRN Dry Eyes  oxyCODONE    IR 5 milliGRAM(s) Oral every 4 hours PRN Moderate Pain (4 - 6)  oxyCODONE    IR 10 milliGRAM(s) Oral every 4 hours PRN Severe Pain (7 - 10)      OBJECTIVE:   [X] No new signs     [ ] Other:    Side Effects:  [X ] None			[ ] Other:    Assessment of Catheter Site:		[ ] Intact		[ ] Other:    ASSESSMENT/PLAN  [ ] Continue current therapy    [X ] Therapy changed to:    [ ] IV PCA       [ ] Epidural     [ X] prn Analgesics     Comments: PRN Oral/IV opioids and/or non-opioid adjuvant analgesics to be used at this point.    Progress Note written now but Patient was seen earlier.

## 2020-12-04 NOTE — DISCHARGE NOTE NURSING/CASE MANAGEMENT/SOCIAL WORK - NSDCFUADDAPPT_GEN_ALL_CORE_FT
Please, call Thoracic Surgery office [phone] and schedule a followup appointment with Dr. Salmon within 2 weeks. Please, obtain a CXR 1-2 days prior to your visit and bring a copy with you for Dr. Cho to review.

## 2020-12-04 NOTE — DISCHARGE NOTE PROVIDER - NSDCACTIVITY_GEN_ALL_CORE
Walking - Outdoors allowed/No heavy lifting/straining/Walking - Indoors allowed/Do not drive or operate machinery/Stairs allowed/Showering allowed Walking - Indoors allowed/Showering allowed/Walking - Outdoors allowed/No heavy lifting/straining/Do not make important decisions/Stairs allowed/Do not drive or operate machinery/Sex allowed

## 2020-12-04 NOTE — DISCHARGE NOTE NURSING/CASE MANAGEMENT/SOCIAL WORK - PATIENT PORTAL LINK FT
You can access the FollowMyHealth Patient Portal offered by [facility] by registering at the following website: [email or web address]. By joining [facility]’s FollowMyHealth portal, you will also be able to view your health information using other applications (apps) compatible with our system.

## 2020-12-04 NOTE — DISCHARGE NOTE PROVIDER - NSDCMRMEDTOKEN_GEN_ALL_CORE_FT
amLODIPine 5 mg oral tablet: 1 tab(s) orally once a day  atenolol 50 mg oral tablet: 1.5 tab(s) orally once a day  atorvastatin 20 mg oral tablet: 1 tab(s) orally once a day  Centrum: 1 tab(s) orally once a day. Last dose 11/25/20  levothyroxine 100 mcg (0.1 mg) oral tablet: 1 tab(s) orally once a day  losartan-hydrochlorothiazide 100 mg-25 mg oral tablet: 1 tab(s) orally once a day  Omega-3 1000 mg oral capsule: 4 cap(s) orally once a day. Last dose 11/25/20  potassium chloride 10 mEq oral tablet, extended release: 1 tab(s) orally once a day  Refresh ophthalmic solution: 1 drop(s) to both eyes , As Needed  Vitamin D3: 1 tab(s) orally once a day   amLODIPine 5 mg oral tablet: 1 tab(s) orally once a day  atenolol 50 mg oral tablet: 1.5 tab(s) orally once a day  atorvastatin 20 mg oral tablet: 1 tab(s) orally once a day  Centrum: 1 tab(s) orally once a day.  Colace 100 mg oral capsule: 1 cap(s) orally 2 times a day  levothyroxine 100 mcg (0.1 mg) oral tablet: 1 tab(s) orally once a day  losartan-hydrochlorothiazide 100 mg-25 mg oral tablet: 1 tab(s) orally once a day  oxycodone-acetaminophen 5 mg-325 mg oral tablet: 2 tab(s) orally every 4 to 6 hours, As Needed for severe pain. Can take 1 tab for moderate. MDD:10  potassium chloride 10 mEq oral tablet, extended release: 1 tab(s) orally once a day  Refresh ophthalmic solution: 1 drop(s) to both eyes , As Needed  senna oral tablet: 2 tab(s) orally once a day (at bedtime)  senna oral tablet: 2 tab(s) orally once a day  Tylenol 325 mg oral tablet: 2 tab(s) orally every 6 hours, As Needed  Please note that each Percocet has 1 Tylenol in it.   Vitamin D3: 1 tab(s) orally once a day

## 2020-12-04 NOTE — DISCHARGE NOTE PROVIDER - NSDCCPCAREPLAN_GEN_ALL_CORE_FT
PRINCIPAL DISCHARGE DIAGNOSIS  Diagnosis: Adenocarcinoma, lung  Assessment and Plan of Treatment:

## 2020-12-04 NOTE — DISCHARGE NOTE PROVIDER - HOSPITAL COURSE
70 year old female h/o HTN HLD poliomyelitis presents to presurgical testing with diagnosis of solitary pulmonary nodule scheduled for flexible bronchoscopy left video assisted thoracoscopy lung resection. Pt with increasing lung nodule on surveillance CT scan. Pt denies SOB, cough, or unintentional weight loss.  Patient underwent Flexible bronchoscopy, LAINE wedge resection, completion left upper lingula sparing lobectomy with mediastinal lymph node dissection of level 5 and 7.  Her postop course was uncomplicated and she was discharged home on POD# 70 year old female h/o HTN HLD poliomyelitis presents to presurgical testing with diagnosis of solitary pulmonary nodule scheduled for flexible bronchoscopy left video assisted thoracoscopy lung resection. Pt with increasing lung nodule on surveillance CT scan. Pt denies SOB, cough, or unintentional weight loss.  Patient underwent Flexible bronchoscopy, LAINE wedge resection, completion left upper lingula sparing lobectomy with mediastinal lymph node dissection of level 5 and 7.  Her postop course was uncomplicated . Today CXR removed. AS per Radiology, CXR stable. DR. Salmon aware of CXR today. Pt feels well.  VATS c/d/i. Lungs CTA. AMbulating frequently. Cleared for dc to home by Dr. Salmon.  Vital Signs Last 24 Hrs  T(C): 36.9 (04 Dec 2020 08:57), Max: 36.9 (03 Dec 2020 23:08)  T(F): 98.5 (04 Dec 2020 08:57), Max: 98.5 (04 Dec 2020 08:57)  HR: 78 (04 Dec 2020 08:57) (66 - 89)  BP: 134/65 (04 Dec 2020 08:57) (112/52 - 146/67)  BP(mean): 74 (03 Dec 2020 21:00) (70 - 93)  RR: 18 (04 Dec 2020 08:57) (12 - 19)  SpO2: 98% (04 Dec 2020 08:57) (94% - 98%)

## 2020-12-04 NOTE — DISCHARGE NOTE PROVIDER - NSDCFUADDAPPT_GEN_ALL_CORE_FT
Please, call Thoracic Surgery office and schedule a followup appointment with Dr. Salmon within 2 weeks. Please, obtain a CXR 1-2 days prior to your visit and bring a copy with you for Dr. Cho to review. Please, call Thoracic Surgery office [phone] and schedule a followup appointment with Dr. Salmon within 2 weeks. Please, obtain a CXR 1-2 days prior to your visit and bring a copy with you for Dr. Cho to review.

## 2020-12-04 NOTE — DISCHARGE NOTE PROVIDER - CARE PROVIDER_API CALL
[facility] Salmon  SURGERY  [address] [place] [address], [place] [address]  Phone: [phone]  Fax: [phone]  Follow Up Time:

## 2020-12-04 NOTE — DISCHARGE NOTE PROVIDER - NSDCFUADDINST_GEN_ALL_CORE_FT
Walk frequently. You may climb stairs. Continue to use incentive spirometer.   Keep dressing in place until Sunday morning. Then you can remove it and begin to shower. Wound can be left uncovered. Suture will be removed in office.

## 2020-12-07 PROBLEM — M43.10 SPONDYLOLISTHESIS, SITE UNSPECIFIED: Chronic | Status: ACTIVE | Noted: 2020-11-25

## 2020-12-07 PROBLEM — M1A.9XX0 CHRONIC GOUT, UNSPECIFIED, WITHOUT TOPHUS (TOPHI): Chronic | Status: ACTIVE | Noted: 2020-11-25

## 2020-12-07 PROBLEM — R91.1 SOLITARY PULMONARY NODULE: Chronic | Status: ACTIVE | Noted: 2020-11-25

## 2020-12-07 PROBLEM — Z98.890 OTHER SPECIFIED POSTPROCEDURAL STATES: Chronic | Status: ACTIVE | Noted: 2020-11-25

## 2020-12-07 PROBLEM — M19.90 UNSPECIFIED OSTEOARTHRITIS, UNSPECIFIED SITE: Chronic | Status: ACTIVE | Noted: 2020-11-25

## 2020-12-07 PROBLEM — M48.00 SPINAL STENOSIS, SITE UNSPECIFIED: Chronic | Status: ACTIVE | Noted: 2020-11-25

## 2020-12-07 PROBLEM — Z86.69 PERSONAL HISTORY OF OTHER DISEASES OF THE NERVOUS SYSTEM AND SENSE ORGANS: Chronic | Status: ACTIVE | Noted: 2020-11-25

## 2020-12-22 ENCOUNTER — RESULT REVIEW (OUTPATIENT)
Age: 70
End: 2020-12-22

## 2020-12-22 ENCOUNTER — APPOINTMENT (OUTPATIENT)
Dept: THORACIC SURGERY | Facility: CLINIC | Age: 70
End: 2020-12-22
Payer: MEDICARE

## 2020-12-22 ENCOUNTER — OUTPATIENT (OUTPATIENT)
Dept: OUTPATIENT SERVICES | Facility: HOSPITAL | Age: 70
LOS: 1 days | End: 2020-12-22
Payer: MEDICARE

## 2020-12-22 ENCOUNTER — APPOINTMENT (OUTPATIENT)
Dept: RADIOLOGY | Facility: HOSPITAL | Age: 70
End: 2020-12-22

## 2020-12-22 VITALS
HEART RATE: 77 BPM | DIASTOLIC BLOOD PRESSURE: 80 MMHG | BODY MASS INDEX: 36.32 KG/M2 | TEMPERATURE: 97.8 F | RESPIRATION RATE: 17 BRPM | HEIGHT: 63 IN | WEIGHT: 205 LBS | OXYGEN SATURATION: 96 % | SYSTOLIC BLOOD PRESSURE: 157 MMHG

## 2020-12-22 DIAGNOSIS — C34.92 MALIGNANT NEOPLASM OF UNSPECIFIED PART OF LEFT BRONCHUS OR LUNG: ICD-10-CM

## 2020-12-22 DIAGNOSIS — Z96.659 PRESENCE OF UNSPECIFIED ARTIFICIAL KNEE JOINT: Chronic | ICD-10-CM

## 2020-12-22 DIAGNOSIS — Z98.49 CATARACT EXTRACTION STATUS, UNSPECIFIED EYE: Chronic | ICD-10-CM

## 2020-12-22 PROCEDURE — 99024 POSTOP FOLLOW-UP VISIT: CPT

## 2020-12-22 PROCEDURE — 71046 X-RAY EXAM CHEST 2 VIEWS: CPT | Mod: 26

## 2021-02-23 ENCOUNTER — APPOINTMENT (OUTPATIENT)
Dept: THORACIC SURGERY | Facility: CLINIC | Age: 71
End: 2021-02-23
Payer: MEDICARE

## 2021-02-23 PROCEDURE — 99024 POSTOP FOLLOW-UP VISIT: CPT

## 2021-04-01 ENCOUNTER — APPOINTMENT (OUTPATIENT)
Dept: CT IMAGING | Facility: CLINIC | Age: 71
End: 2021-04-01
Payer: MEDICARE

## 2021-04-01 ENCOUNTER — OUTPATIENT (OUTPATIENT)
Dept: OUTPATIENT SERVICES | Facility: HOSPITAL | Age: 71
LOS: 1 days | End: 2021-04-01
Payer: COMMERCIAL

## 2021-04-01 DIAGNOSIS — Z98.49 CATARACT EXTRACTION STATUS, UNSPECIFIED EYE: Chronic | ICD-10-CM

## 2021-04-01 DIAGNOSIS — R91.1 SOLITARY PULMONARY NODULE: ICD-10-CM

## 2021-04-01 DIAGNOSIS — Z00.8 ENCOUNTER FOR OTHER GENERAL EXAMINATION: ICD-10-CM

## 2021-04-01 DIAGNOSIS — Z96.659 PRESENCE OF UNSPECIFIED ARTIFICIAL KNEE JOINT: Chronic | ICD-10-CM

## 2021-04-01 PROCEDURE — 71250 CT THORAX DX C-: CPT

## 2021-04-01 PROCEDURE — 71250 CT THORAX DX C-: CPT | Mod: 26

## 2021-04-09 ENCOUNTER — APPOINTMENT (OUTPATIENT)
Dept: THORACIC SURGERY | Facility: CLINIC | Age: 71
End: 2021-04-09
Payer: MEDICARE

## 2021-04-09 VITALS
OXYGEN SATURATION: 94 % | HEART RATE: 91 BPM | DIASTOLIC BLOOD PRESSURE: 89 MMHG | RESPIRATION RATE: 18 BRPM | SYSTOLIC BLOOD PRESSURE: 170 MMHG

## 2021-04-09 PROCEDURE — 99072 ADDL SUPL MATRL&STAF TM PHE: CPT

## 2021-04-09 PROCEDURE — 99214 OFFICE O/P EST MOD 30 MIN: CPT

## 2021-04-09 NOTE — DATA REVIEWED
[FreeTextEntry1] : Independently reviewed the following imaging:\par CT Chest on 09/04/2020 & CT Chest on 4/1/21\par

## 2021-04-09 NOTE — HISTORY OF PRESENT ILLNESS
[FreeTextEntry1] : Ms. Gaona is a 70 year old female. She is a former smoker (20 years, 1.5 PPD, Quit 1992) w/ hx of HTN, poliomyelitis, and partial thyroidectomy. Initially presented to office in November, 2020 for surgical evaluation of enlarging Left lung nodule. \par \par CT Chest on 09/04/2020 reveals: \par - Left apical 2.1 x 1.2 cm semisolid nodule, increased in size since prior study\par - Adjacent 6 mm semisolid nodule (4: 26), unchanged\par - RML 0.8 cm groundglass nodule (4: 55), unchanged\par - LLL 5 mm nodule (4: 77), also unchanged\par - Additional scattered bilateral groundglass nodules\par - 1.5 cm (3: 56) low-attenuation lesion in the pancreatic head \par \par PFTs on 10/23/2020 reveals:\par - FEV1: 2.38 (113%)\par - DLCO: 22.23 (109%)\par \par 12/3/2020:  s/p Flexible bronch, uniportal Lt VATS, wedge rsx of LAINE x1, lingula sparing LULobectomy, MLND; Path of LAINE wedge resection revealing Invasive adenocarcinoma, acinar predominant with papillary subtype present; 2.4 cm; G2; All margins and all (0/23) lymph nodes negative; pT1c pN0 (Stage IA3). Path of LULobectomy: Negative for malignancy; multiple meningotheliod nodules. \par \par CT Chest on 4/1/21:\par - Post op changes\par - Enlarged RUL ground glass nodule; 1.1 x 0.9  (series 3, image 59);  previously 1 x 0.7 cm. 0.6 cm\par - LLL subsolid nodule (series 3 image 56) is stable  \par - Stable, mild subpleural nodularity within RLL (series 3 image 80)\par - Few other subpleural oval nodules representing benign intrapulmonary lymph nodes are also stable \par \par Patient presents to office for follow up. Patient denies worsening SOB, chest pain, cough, hemoptysis, fever, chills, night sweats, lightheadedness or dizziness.\par \par \par

## 2021-04-09 NOTE — CONSULT LETTER
[Dear  ___] : Dear  [unfilled], [Courtesy Letter:] : I had the pleasure of seeing your patient, [unfilled], in my office today. [Please see my note below.] : Please see my note below. [Sincerely,] : Sincerely, [FreeTextEntry2] : Dr. Arslan Larios (Pulm/Ref) \par  [FreeTextEntry3] : North Salmon MD, FACS \par Chief, Division of Thoracic Surgery \par Director, Minimally Invasive Thoracic Surgery \par Department of Cardiovascular and Thoracic Surgery \par [facility] \par , Cardiovascular and Thoracic Surgery\par \par \par

## 2021-04-09 NOTE — PHYSICAL EXAM
[] : no respiratory distress [Respiration, Rhythm And Depth] : normal respiratory rhythm and effort [Exaggerated Use Of Accessory Muscles For Inspiration] : no accessory muscle use [Auscultation Breath Sounds / Voice Sounds] : lungs were clear to auscultation bilaterally [Heart Rate And Rhythm] : heart rate was normal and rhythm regular [Examination Of The Chest] : the chest was normal in appearance [Chest Visual Inspection Thoracic Asymmetry] : no chest asymmetry [Diminished Respiratory Excursion] : normal chest expansion [Breast Appearance] : normal in appearance [Breast Palpation Mass] : no palpable masses [Cervical Lymph Nodes Enlarged Posterior Bilaterally] : posterior cervical [Cervical Lymph Nodes Enlarged Anterior Bilaterally] : anterior cervical [Supraclavicular Lymph Nodes Enlarged Bilaterally] : supraclavicular [No CVA Tenderness] : no ~M costovertebral angle tenderness [No Spinal Tenderness] : no spinal tenderness [Oriented To Time, Place, And Person] : oriented to person, place, and time [Impaired Insight] : insight and judgment were intact [Affect] : the affect was normal [Mood] : the mood was normal [Memory Recent] : recent memory was not impaired [Memory Remote] : remote memory was not impaired

## 2021-04-13 ENCOUNTER — APPOINTMENT (OUTPATIENT)
Dept: CARDIOLOGY | Facility: CLINIC | Age: 71
End: 2021-04-13
Payer: MEDICARE

## 2021-04-13 VITALS — SYSTOLIC BLOOD PRESSURE: 130 MMHG | DIASTOLIC BLOOD PRESSURE: 80 MMHG

## 2021-04-13 DIAGNOSIS — R06.00 DYSPNEA, UNSPECIFIED: ICD-10-CM

## 2021-04-13 DIAGNOSIS — Z01.810 ENCOUNTER FOR PREPROCEDURAL CARDIOVASCULAR EXAMINATION: ICD-10-CM

## 2021-04-13 DIAGNOSIS — Z13.6 ENCOUNTER FOR SCREENING FOR CARDIOVASCULAR DISORDERS: ICD-10-CM

## 2021-04-13 PROCEDURE — 99214 OFFICE O/P EST MOD 30 MIN: CPT | Mod: 95

## 2021-04-13 NOTE — PHYSICAL EXAM
[General Appearance - Well Developed] : well developed [Normal Appearance] : normal appearance [Well Groomed] : well groomed [General Appearance - Well Nourished] : well nourished [No Deformities] : no deformities [General Appearance - In No Acute Distress] : no acute distress [Normal Jugular Venous A Waves Present] : normal jugular venous A waves present [Normal Jugular Venous V Waves Present] : normal jugular venous V waves present [No Jugular Venous Nielson A Waves] : no jugular venous nielson A waves [Respiration, Rhythm And Depth] : normal respiratory rhythm and effort [Exaggerated Use Of Accessory Muscles For Inspiration] : no accessory muscle use [Auscultation Breath Sounds / Voice Sounds] : lungs were clear to auscultation bilaterally [Heart Rate And Rhythm] : heart rate and rhythm were normal [Heart Sounds] : normal S1 and S2 [Murmurs] : no murmurs present [Abdomen Soft] : soft [Abdomen Tenderness] : non-tender [Abdomen Mass (___ Cm)] : no abdominal mass palpated [Abnormal Walk] : normal gait [Gait - Sufficient For Exercise Testing] : the gait was sufficient for exercise testing [Nail Clubbing] : no clubbing of the fingernails [Cyanosis, Localized] : no localized cyanosis [Petechial Hemorrhages (___cm)] : no petechial hemorrhages [Skin Color & Pigmentation] : normal skin color and pigmentation [] : no rash [No Venous Stasis] : no venous stasis [Skin Lesions] : no skin lesions [No Skin Ulcers] : no skin ulcer [No Xanthoma] : no  xanthoma was observed [Oriented To Time, Place, And Person] : oriented to person, place, and time [Affect] : the affect was normal [Mood] : the mood was normal [No Anxiety] : not feeling anxious

## 2021-04-15 ENCOUNTER — OUTPATIENT (OUTPATIENT)
Dept: OUTPATIENT SERVICES | Facility: HOSPITAL | Age: 71
LOS: 1 days | End: 2021-04-15

## 2021-04-15 VITALS
HEIGHT: 63 IN | DIASTOLIC BLOOD PRESSURE: 78 MMHG | TEMPERATURE: 97 F | HEART RATE: 92 BPM | OXYGEN SATURATION: 98 % | WEIGHT: 210.1 LBS | RESPIRATION RATE: 16 BRPM | SYSTOLIC BLOOD PRESSURE: 140 MMHG

## 2021-04-15 DIAGNOSIS — R91.1 SOLITARY PULMONARY NODULE: ICD-10-CM

## 2021-04-15 DIAGNOSIS — I10 ESSENTIAL (PRIMARY) HYPERTENSION: ICD-10-CM

## 2021-04-15 DIAGNOSIS — Z98.49 CATARACT EXTRACTION STATUS, UNSPECIFIED EYE: Chronic | ICD-10-CM

## 2021-04-15 DIAGNOSIS — Z98.890 OTHER SPECIFIED POSTPROCEDURAL STATES: Chronic | ICD-10-CM

## 2021-04-15 DIAGNOSIS — Z96.659 PRESENCE OF UNSPECIFIED ARTIFICIAL KNEE JOINT: Chronic | ICD-10-CM

## 2021-04-15 DIAGNOSIS — E03.9 HYPOTHYROIDISM, UNSPECIFIED: ICD-10-CM

## 2021-04-15 LAB
ALBUMIN SERPL ELPH-MCNC: 4.4 G/DL — SIGNIFICANT CHANGE UP (ref 3.3–5)
ALP SERPL-CCNC: 78 U/L — SIGNIFICANT CHANGE UP (ref 40–120)
ALT FLD-CCNC: 37 U/L — HIGH (ref 4–33)
ANION GAP SERPL CALC-SCNC: 15 MMOL/L — HIGH (ref 7–14)
AST SERPL-CCNC: 36 U/L — HIGH (ref 4–32)
BILIRUB SERPL-MCNC: 0.3 MG/DL — SIGNIFICANT CHANGE UP (ref 0.2–1.2)
BLD GP AB SCN SERPL QL: NEGATIVE — SIGNIFICANT CHANGE UP
BUN SERPL-MCNC: 9 MG/DL — SIGNIFICANT CHANGE UP (ref 7–23)
CALCIUM SERPL-MCNC: 9.7 MG/DL — SIGNIFICANT CHANGE UP (ref 8.4–10.5)
CHLORIDE SERPL-SCNC: 98 MMOL/L — SIGNIFICANT CHANGE UP (ref 98–107)
CO2 SERPL-SCNC: 26 MMOL/L — SIGNIFICANT CHANGE UP (ref 22–31)
CREAT SERPL-MCNC: 0.45 MG/DL — LOW (ref 0.5–1.3)
GLUCOSE SERPL-MCNC: 98 MG/DL — SIGNIFICANT CHANGE UP (ref 70–99)
HCT VFR BLD CALC: 45.3 % — HIGH (ref 34.5–45)
HGB BLD-MCNC: 14.8 G/DL — SIGNIFICANT CHANGE UP (ref 11.5–15.5)
MCHC RBC-ENTMCNC: 28.6 PG — SIGNIFICANT CHANGE UP (ref 27–34)
MCHC RBC-ENTMCNC: 32.7 GM/DL — SIGNIFICANT CHANGE UP (ref 32–36)
MCV RBC AUTO: 87.6 FL — SIGNIFICANT CHANGE UP (ref 80–100)
NRBC # BLD: 0 /100 WBCS — SIGNIFICANT CHANGE UP
NRBC # FLD: 0 K/UL — SIGNIFICANT CHANGE UP
PLATELET # BLD AUTO: 262 K/UL — SIGNIFICANT CHANGE UP (ref 150–400)
POTASSIUM SERPL-MCNC: 3.4 MMOL/L — LOW (ref 3.5–5.3)
POTASSIUM SERPL-SCNC: 3.4 MMOL/L — LOW (ref 3.5–5.3)
PROT SERPL-MCNC: 7.5 G/DL — SIGNIFICANT CHANGE UP (ref 6–8.3)
RBC # BLD: 5.17 M/UL — SIGNIFICANT CHANGE UP (ref 3.8–5.2)
RBC # FLD: 13.7 % — SIGNIFICANT CHANGE UP (ref 10.3–14.5)
RH IG SCN BLD-IMP: POSITIVE — SIGNIFICANT CHANGE UP
SODIUM SERPL-SCNC: 139 MMOL/L — SIGNIFICANT CHANGE UP (ref 135–145)
WBC # BLD: 7.62 K/UL — SIGNIFICANT CHANGE UP (ref 3.8–10.5)
WBC # FLD AUTO: 7.62 K/UL — SIGNIFICANT CHANGE UP (ref 3.8–10.5)

## 2021-04-15 RX ORDER — ACETAMINOPHEN 500 MG
2 TABLET ORAL
Qty: 0 | Refills: 0 | DISCHARGE

## 2021-04-15 RX ORDER — LOSARTAN/HYDROCHLOROTHIAZIDE 100MG-25MG
1 TABLET ORAL
Qty: 0 | Refills: 0 | DISCHARGE

## 2021-04-15 RX ORDER — MULTIVIT-MIN/FERROUS GLUCONATE 9 MG/15 ML
1 LIQUID (ML) ORAL
Qty: 0 | Refills: 0 | DISCHARGE

## 2021-04-15 RX ORDER — CHOLECALCIFEROL (VITAMIN D3) 125 MCG
1 CAPSULE ORAL
Qty: 0 | Refills: 0 | DISCHARGE

## 2021-04-15 RX ORDER — ATORVASTATIN CALCIUM 80 MG/1
1 TABLET, FILM COATED ORAL
Qty: 0 | Refills: 0 | DISCHARGE

## 2021-04-15 RX ORDER — LEVOTHYROXINE SODIUM 125 MCG
1 TABLET ORAL
Qty: 0 | Refills: 0 | DISCHARGE

## 2021-04-15 RX ORDER — SODIUM CHLORIDE 9 MG/ML
1000 INJECTION, SOLUTION INTRAVENOUS
Refills: 0 | Status: DISCONTINUED | OUTPATIENT
Start: 2021-04-22 | End: 2021-04-23

## 2021-04-15 RX ORDER — POTASSIUM CHLORIDE 20 MEQ
1 PACKET (EA) ORAL
Qty: 0 | Refills: 0 | DISCHARGE

## 2021-04-15 RX ORDER — DOCUSATE SODIUM 100 MG
1 CAPSULE ORAL
Qty: 0 | Refills: 0 | DISCHARGE

## 2021-04-15 RX ORDER — ATENOLOL 25 MG/1
1.5 TABLET ORAL
Qty: 0 | Refills: 0 | DISCHARGE

## 2021-04-15 RX ORDER — AMLODIPINE BESYLATE 2.5 MG/1
1 TABLET ORAL
Qty: 0 | Refills: 0 | DISCHARGE

## 2021-04-15 RX ORDER — SENNA PLUS 8.6 MG/1
2 TABLET ORAL
Qty: 0 | Refills: 0 | DISCHARGE

## 2021-04-15 NOTE — H&P PST ADULT - NSICDXPROBLEM_GEN_ALL_CORE_FT
PROBLEM DIAGNOSES  Problem: Solitary pulmonary nodule  Assessment and Plan:     Problem: HTN (hypertension)  Assessment and Plan:     Problem: Hypothyroidism  Assessment and Plan:        PROBLEM DIAGNOSES  Problem: Solitary pulmonary nodule  Assessment and Plan: Pt is scheduled for flexible bronchoscopy, right video assisted thoracoscopy, lung resection on 4/22/21.  Verbal and written pre op instructions reviewed with patient and pt able to verbalize understanding.   Verbal and written instructions given with chlorhexidine wash, pt able to verbalize understanding via teach back method. COVID testing scheduled preop per pt.         Problem: HTN (hypertension)  Assessment and Plan: Pt instructed to take amlodipine and losartan/HCTZ AM of surgery with a sip of water, pt able to verbalize understanding.   Pt met STOP BANG score for SHANA precaution. OR booking notified via fax.     Problem: Hypothyroidism  Assessment and Plan: Pt instructed to take levothyroxine AM of surgery with a sip of water, pt able to verbalize understanding.

## 2021-04-15 NOTE — H&P PST ADULT - RS GEN PE MLT RESP DETAILS PC
airway patent/breath sounds equal/good air movement/respirations non-labored/clear to auscultation bilaterally/no rales/no rhonchi

## 2021-04-15 NOTE — H&P PST ADULT - MUSCULOSKELETAL COMMENTS
poliomyelitis, right ankle pain, ambulates with a crutches, uses AFO and orthotics right leg shorter in length

## 2021-04-15 NOTE — H&P PST ADULT - NSICDXPASTMEDICALHX_GEN_ALL_CORE_FT
PAST MEDICAL HISTORY:  Arthritis     Chronic gout     Diverticulosis     Dyslipidemia     h/o Polio in childhood RLE weakness, uses cane to ambulate    History of macular degeneration     HTN (hypertension)     Hypothyroidism     Lung cancer left    Obesity     S/P lumbar laminectomy     Solitary pulmonary nodule     Spinal stenosis     Spondylolisthesis     Thyroid nodule

## 2021-04-15 NOTE — H&P PST ADULT - NEGATIVE OPHTHALMOLOGIC SYMPTOMS
no diplopia/no photophobia/no lacrimation L/no lacrimation R/no blurred vision L/no blurred vision R/no pain L/no pain R/no loss of vision L/no loss of vision R

## 2021-04-15 NOTE — H&P PST ADULT - HISTORY OF PRESENT ILLNESS
71 yo female with preop dx. solitary pulmonary nodule presents to pre surgical testing.  Pt s/p LULobectomy 12/2020 revealing malignancy.  Follow up CT Chest showed right lung nodule had increased in size.  Pt is scheduled for flexible bronchoscopy, right video assisted thoracoscopy, lung resection.

## 2021-04-15 NOTE — H&P PST ADULT - NEGATIVE GENERAL GENITOURINARY SYMPTOMS
no hematuria/no renal colic/no flank pain R/no bladder infections/no dysuria/normal urinary frequency

## 2021-04-15 NOTE — H&P PST ADULT - NEGATIVE ENMT SYMPTOMS
To get better and follow your care plan as instructed. no hearing difficulty/no ear pain/no tinnitus/no vertigo/no sinus symptoms/no nose bleeds/no throat pain/no dysphagia

## 2021-04-15 NOTE — H&P PST ADULT - MUSCULOSKELETAL
details… detailed exam ROM intact/no joint swelling/no joint erythema/no joint warmth/no calf tenderness/diminished strength

## 2021-04-15 NOTE — H&P PST ADULT - NSICDXPASTSURGICALHX_GEN_ALL_CORE_FT
PAST SURGICAL HISTORY:  H/O lumbar L5-L6 laminectomy 2003    H/O partial thyroidectomy     History of lung surgery LULobectomy 2020    S/P bilateral salpingo-oophorectomy   secondary to ovarian cyst    S/P cataract surgery 2014    S/P  Section , 1981    S/P D&C 2004    s/p right knee arthroscopy for meniscus tear ,     S/P TKR (total knee replacement) Left     surgical repair of left leg growth plate secondary to polio      surgical repair of right foot secondary to polio      umbilical Hernia repair with mesh 2010

## 2021-04-16 NOTE — ASU PREOP CHECKLIST - # OF UNITS
A/P:      ICD-10-CM    1. Severe episode of recurrent major depressive disorder, without psychotic features (H)  F33.2 MENTAL HEALTH REFERRAL  - Adult; Outpatient Treatment; Individual/Couples/Family/Group Therapy/Health Psychology; [facility] [place]: [facility] [phone]; We will contact you to schedule the appointment or please call with any questions     ARIPiprazole (ABILIFY) 2 MG tablet   2. Non-specific colitis  K52.9 GASTROENTEROLOGY ADULT REF PROCEDURE ONLY     Depression:  Discussed options of switching med (has only ever been on sertraline) vs adding wellbutrin vs alternate.  Pt very reluctant to switch sertraline, feels it ahs worked very well for her.  Feels she has taken well butrin in the past for a few months but did not help her at all.  Has heard commercials about other avaiable agents, would like to add one of them.  Reviewed risks and side effects of abilify, plan as below    Patient Instructions   For your mood:  We decided to continue the sertraline for now at the current dose and to add a small dose of a medication called abilify.  The most common side effect of this medicine is restlessness.  Please let me know if this is a problem for you.  I would like you to message me in 2 weeks with an update on how you are doing and we should have a formal telephone visit again in 4 weeks (before your refill is needed)  I also think it is really important to get in with a therapist.  I placed a new referral and you should get a call to schedule in 1-2 days.      For your bowel:  Very important to get your colonoscopy taken care of.  Please schedule this as soon as you are able.  You can request your work send me [facility] paperwork to complete to cover time off work you might miss due to your bowel concerns.      For your hip:  Please get in with you surgeon to discuss.                Dami Funez is a 65 year old who presents for the following health issues   HPI     Depression  Followup    How are you doing with your depression since your last visit? Worsened     Are you having other symptoms that might be associated with depression? Yes:  stress, fatigue and anxious    Have you had a significant life event?  Job Concerns     Are you feeling anxious or having panic attacks?   Yes:  anxious    Do you have any concerns with your use of alcohol or other drugs? Yes:  pt. states she has been drinking to much.     Social History     Tobacco Use     Smoking status: Current Every Day Smoker     Packs/day: 0.50     Years: 35.00     Pack years: 17.50     Types: Cigarettes     Smokeless tobacco: Never Used   Substance Use Topics     Alcohol use: Yes     Comment: weekends drinking more 3-5 drinks     Drug use: No     PHQ 8/14/2020 3/7/2021 4/16/2021   PHQ-9 Total Score 7 9 26   Q9: Thoughts of better off dead/self-harm past 2 weeks Not at all Several days More than half the days   F/U: Thoughts of suicide or self-harm - No -   F/U: Self harm-plan - - -   F/U: Self-harm action - - -   F/U: Safety concerns - No -     TAMIKO-7 SCORE 8/14/2020 3/7/2021 4/16/2021   Total Score - 5 (mild anxiety) -   Total Score 6 5 18     Last PHQ-9 4/16/2021   1.  Little interest or pleasure in doing things 3   2.  Feeling down, depressed, or hopeless 3   3.  Trouble falling or staying asleep, or sleeping too much 3   4.  Feeling tired or having little energy 3   5.  Poor appetite or overeating 3   6.  Feeling bad about yourself 3   7.  Trouble concentrating 3   8.  Moving slowly or restless 3   Q9: Thoughts of better off dead/self-harm past 2 weeks 2   PHQ-9 Total Score 26   Difficulty at work, home, or with people Very difficult   In the past two weeks have you had thoughts of suicide or self harm? -   Do you have concerns about your personal safety or the safety of others? -   In the past 2 weeks have you thought about a plan or had intention to harm yourself? -   In the past 2 weeks have you acted on these thoughts in  "any way? -     TAMIKO-7  4/16/2021   1. Feeling nervous, anxious, or on edge 3   2. Not being able to stop or control worrying 3   3. Worrying too much about different things 3   4. Trouble relaxing 3   5. Being so restless that it is hard to sit still 0   6. Becoming easily annoyed or irritable 3   7. Feeling afraid, as if something awful might happen 3   TAMIKO-7 Total Score 18   If you checked any problems, how difficult have they made it for you to do your work, take care of things at home, or get along with other people? Very difficult       Suicide Assessment Five-step Evaluation and Treatment (SAFE-T)      How many servings of fruits and vegetables do you eat daily?  0-1    On average, how many sweetened beverages do you drink each day (Examples: soda, juice, sweet tea, etc.  Do NOT count diet or artificially sweetened beverages)?   4 weekly with drinks    How many days per week do you exercise enough to make your heart beat faster? none    How many minutes a day do you exercise enough to make your heart beat faster? none    How many days per week do you miss taking your medication? 0    Feels like mood currently is as bad as it has been.  Started med in 2006 with issues with job stress  Need up on 300mg sertraline eventually  Has never been on other medication for her mood.    Feels she has been dong \"terrible\".  Feels very unhappy at her job and like she is not doing a good job there.    Feels she is in a \"hoarder\" situation.  Feels depressed about her situation and that she is not really doing anything about it.    Concern over her job has really impacted her.  Income is very important to her and feels she is \"screwing it up\".  Had 3 cats and now is down to just one and feels guilty because cat has upper respiratory issue that she feels are related to her smoking.      Feels like she has alienated family members due to behavior with drinking.  Feels family members are very lewis and judgmental.    Has been " "drinking more recently.  Drinks vodka as first choice.  Drinks 3-4 days per week.  Usually \"1 drink\" at home and is at least a double.  Then drinks 2 double shots when out once per week with her sister.    Feels like she has had issue with EtOH in the past, never in treatment before.  Has had periods of sobriety but not recently.  Does not drink on a daily basis but does feel it is an issue.    Denies any active suicidal ideation.  States she would never harm herself and feels she is safe.  Does talk with one of ehr siscters but feel like she does not want to \"burden\" her.    *  Missed 2 days of work last week due to hip/back pain.     Feels she needs to get this addressed.  States she has been putting off her hip replacement due to not wanting to miss work but now really needs to get it done    *  Last Thursday had abd pain and a change in her bowel habits.  Feels this has settled down and is back to normal.  Had an episode of colitis last year for which she was in the ED.  Feels she has similar symptoms now that flare periodically.    At times has fecal urgency associated with this.  Issue is that there is not a ladies room on her floor at work.  Feels she missed work for this a couple of days every 2-3 months.  Would like to have FMLA to cover.  Never followed up with recommended colonoscopy after the wellness visit to further evaluate this colitis episode.       Review of Systems   Constitutional, HEENT, cardiovascular, pulmonary, gi and gu systems are negative, except as otherwise noted.      Objective    BP (!) 142/82   Pulse 73   Temp 97.7  F (36.5  C) (Tympanic)   Resp 16   Ht 1.594 m (5' 2.75\")   Wt 84.8 kg (187 lb)   SpO2 98%   BMI 33.39 kg/m    Body mass index is 33.39 kg/m .  Physical Exam   PE:  VS as above   Gen:  WN/WD/WH female in NAD   Psych: Alert and oriented times 3; coherent speech, normal   rate and volume, able to articulate logical thoughts, able   to abstract reason, no tangential " thoughts, no hallucinations   or delusions  Her affect is depressed        Epic reviewed           2

## 2021-04-19 ENCOUNTER — APPOINTMENT (OUTPATIENT)
Dept: DISASTER EMERGENCY | Facility: CLINIC | Age: 71
End: 2021-04-19

## 2021-04-19 LAB — SARS-COV-2 N GENE NPH QL NAA+PROBE: NOT DETECTED

## 2021-04-21 ENCOUNTER — TRANSCRIPTION ENCOUNTER (OUTPATIENT)
Age: 71
End: 2021-04-21

## 2021-04-21 NOTE — ASU PATIENT PROFILE, ADULT - PSH
H/O lumbar L5-L6 laminectomy  2003  H/O partial thyroidectomy    History of lung surgery  LULobectomy 2020  S/P bilateral salpingo-oophorectomy    secondary to ovarian cyst  S/P cataract surgery  2014  S/P  Section  ,   S/P D&C  2004  s/p right knee arthroscopy for meniscus tear  ,   S/P TKR (total knee replacement)  Left   surgical repair of left leg growth plate  secondary to polio    surgical repair of right foot  secondary to polio    umbilical Hernia repair with mesh  2010

## 2021-04-22 ENCOUNTER — APPOINTMENT (OUTPATIENT)
Dept: THORACIC SURGERY | Facility: HOSPITAL | Age: 71
End: 2021-04-22

## 2021-04-22 ENCOUNTER — INPATIENT (INPATIENT)
Facility: HOSPITAL | Age: 71
LOS: 0 days | Discharge: HOME | End: 2021-04-23
Attending: THORACIC SURGERY (CARDIOTHORACIC VASCULAR SURGERY) | Admitting: THORACIC SURGERY (CARDIOTHORACIC VASCULAR SURGERY)
Payer: MEDICARE

## 2021-04-22 ENCOUNTER — RESULT REVIEW (OUTPATIENT)
Age: 71
End: 2021-04-22

## 2021-04-22 VITALS
HEIGHT: 63 IN | OXYGEN SATURATION: 98 % | TEMPERATURE: 99 F | WEIGHT: 210.1 LBS | RESPIRATION RATE: 16 BRPM | HEART RATE: 76 BPM | DIASTOLIC BLOOD PRESSURE: 58 MMHG | SYSTOLIC BLOOD PRESSURE: 145 MMHG

## 2021-04-22 DIAGNOSIS — Z96.659 PRESENCE OF UNSPECIFIED ARTIFICIAL KNEE JOINT: Chronic | ICD-10-CM

## 2021-04-22 DIAGNOSIS — Z98.49 CATARACT EXTRACTION STATUS, UNSPECIFIED EYE: Chronic | ICD-10-CM

## 2021-04-22 DIAGNOSIS — R91.1 SOLITARY PULMONARY NODULE: ICD-10-CM

## 2021-04-22 DIAGNOSIS — Z98.890 OTHER SPECIFIED POSTPROCEDURAL STATES: Chronic | ICD-10-CM

## 2021-04-22 PROCEDURE — 32669 THORACOSCOPY REMOVE SEGMENT: CPT | Mod: RT

## 2021-04-22 PROCEDURE — 32674 THORACOSCOPY LYMPH NODE EXC: CPT

## 2021-04-22 PROCEDURE — 71045 X-RAY EXAM CHEST 1 VIEW: CPT | Mod: 26

## 2021-04-22 PROCEDURE — 88309 TISSUE EXAM BY PATHOLOGIST: CPT | Mod: 26

## 2021-04-22 PROCEDURE — 88334 PATH CONSLTJ SURG CYTO XM EA: CPT | Mod: 26,59

## 2021-04-22 PROCEDURE — 99233 SBSQ HOSP IP/OBS HIGH 50: CPT

## 2021-04-22 PROCEDURE — 88331 PATH CONSLTJ SURG 1 BLK 1SPC: CPT | Mod: 26

## 2021-04-22 PROCEDURE — 88313 SPECIAL STAINS GROUP 2: CPT | Mod: 26

## 2021-04-22 PROCEDURE — 88305 TISSUE EXAM BY PATHOLOGIST: CPT | Mod: 26

## 2021-04-22 RX ORDER — ATORVASTATIN CALCIUM 80 MG/1
20 TABLET, FILM COATED ORAL AT BEDTIME
Refills: 0 | Status: DISCONTINUED | OUTPATIENT
Start: 2021-04-22 | End: 2021-04-23

## 2021-04-22 RX ORDER — TOBRAMYCIN 0.3 %
1 DROPS OPHTHALMIC (EYE) EVERY 4 HOURS
Refills: 0 | Status: COMPLETED | OUTPATIENT
Start: 2021-04-22 | End: 2021-04-23

## 2021-04-22 RX ORDER — HYDROMORPHONE HYDROCHLORIDE 2 MG/ML
0.5 INJECTION INTRAMUSCULAR; INTRAVENOUS; SUBCUTANEOUS
Refills: 0 | Status: DISCONTINUED | OUTPATIENT
Start: 2021-04-22 | End: 2021-04-23

## 2021-04-22 RX ORDER — SENNA PLUS 8.6 MG/1
2 TABLET ORAL AT BEDTIME
Refills: 0 | Status: DISCONTINUED | OUTPATIENT
Start: 2021-04-22 | End: 2021-04-23

## 2021-04-22 RX ORDER — GABAPENTIN 400 MG/1
100 CAPSULE ORAL ONCE
Refills: 0 | Status: COMPLETED | OUTPATIENT
Start: 2021-04-22 | End: 2021-04-22

## 2021-04-22 RX ORDER — HEPARIN SODIUM 5000 [USP'U]/ML
5000 INJECTION INTRAVENOUS; SUBCUTANEOUS ONCE
Refills: 0 | Status: COMPLETED | OUTPATIENT
Start: 2021-04-22 | End: 2021-04-22

## 2021-04-22 RX ORDER — CALCIUM CHLORIDE, MAGNESIUM CHLORIDE, POTASSIUM CHLORIDE, SODIUM ACETATE, SODIUM CHLORIDE, SODIUM CITRATE .48; .3; .75; 3.9; 6.4; 1.7 MG/ML; MG/ML; MG/ML; MG/ML; MG/ML; MG/ML
1 SOLUTION OPHTHALMIC ONCE
Refills: 0 | Status: COMPLETED | OUTPATIENT
Start: 2021-04-22 | End: 2021-04-22

## 2021-04-22 RX ORDER — ACETAMINOPHEN 500 MG
975 TABLET ORAL ONCE
Refills: 0 | Status: COMPLETED | OUTPATIENT
Start: 2021-04-22 | End: 2021-04-22

## 2021-04-22 RX ORDER — HEPARIN SODIUM 5000 [USP'U]/ML
5000 INJECTION INTRAVENOUS; SUBCUTANEOUS EVERY 8 HOURS
Refills: 0 | Status: DISCONTINUED | OUTPATIENT
Start: 2021-04-22 | End: 2021-04-23

## 2021-04-22 RX ORDER — CHOLECALCIFEROL (VITAMIN D3) 125 MCG
1000 CAPSULE ORAL DAILY
Refills: 0 | Status: DISCONTINUED | OUTPATIENT
Start: 2021-04-22 | End: 2021-04-23

## 2021-04-22 RX ORDER — FAMOTIDINE 10 MG/ML
20 INJECTION INTRAVENOUS EVERY 12 HOURS
Refills: 0 | Status: DISCONTINUED | OUTPATIENT
Start: 2021-04-22 | End: 2021-04-22

## 2021-04-22 RX ORDER — LEVOTHYROXINE SODIUM 125 MCG
100 TABLET ORAL DAILY
Refills: 0 | Status: DISCONTINUED | OUTPATIENT
Start: 2021-04-22 | End: 2021-04-23

## 2021-04-22 RX ORDER — HYDROMORPHONE HYDROCHLORIDE 2 MG/ML
30 INJECTION INTRAMUSCULAR; INTRAVENOUS; SUBCUTANEOUS
Refills: 0 | Status: DISCONTINUED | OUTPATIENT
Start: 2021-04-22 | End: 2021-04-23

## 2021-04-22 RX ORDER — ONDANSETRON 8 MG/1
4 TABLET, FILM COATED ORAL EVERY 6 HOURS
Refills: 0 | Status: DISCONTINUED | OUTPATIENT
Start: 2021-04-22 | End: 2021-04-23

## 2021-04-22 RX ORDER — NALOXONE HYDROCHLORIDE 4 MG/.1ML
0.1 SPRAY NASAL
Refills: 0 | Status: DISCONTINUED | OUTPATIENT
Start: 2021-04-22 | End: 2021-04-23

## 2021-04-22 RX ADMIN — Medication 1 DROP(S): at 18:36

## 2021-04-22 RX ADMIN — Medication 1 DROP(S): at 22:25

## 2021-04-22 RX ADMIN — ONDANSETRON 4 MILLIGRAM(S): 8 TABLET, FILM COATED ORAL at 11:00

## 2021-04-22 RX ADMIN — HYDROMORPHONE HYDROCHLORIDE 30 MILLILITER(S): 2 INJECTION INTRAMUSCULAR; INTRAVENOUS; SUBCUTANEOUS at 19:15

## 2021-04-22 RX ADMIN — HEPARIN SODIUM 5000 UNIT(S): 5000 INJECTION INTRAVENOUS; SUBCUTANEOUS at 22:25

## 2021-04-22 RX ADMIN — HEPARIN SODIUM 5000 UNIT(S): 5000 INJECTION INTRAVENOUS; SUBCUTANEOUS at 16:26

## 2021-04-22 RX ADMIN — SODIUM CHLORIDE 30 MILLILITER(S): 9 INJECTION, SOLUTION INTRAVENOUS at 20:00

## 2021-04-22 RX ADMIN — Medication 1000 UNIT(S): at 17:18

## 2021-04-22 RX ADMIN — Medication 975 MILLIGRAM(S): at 06:34

## 2021-04-22 RX ADMIN — SENNA PLUS 2 TABLET(S): 8.6 TABLET ORAL at 22:25

## 2021-04-22 RX ADMIN — HEPARIN SODIUM 5000 UNIT(S): 5000 INJECTION INTRAVENOUS; SUBCUTANEOUS at 06:34

## 2021-04-22 RX ADMIN — Medication 1 DROP(S): at 16:28

## 2021-04-22 RX ADMIN — SODIUM CHLORIDE 30 MILLILITER(S): 9 INJECTION, SOLUTION INTRAVENOUS at 06:38

## 2021-04-22 RX ADMIN — GABAPENTIN 100 MILLIGRAM(S): 400 CAPSULE ORAL at 06:34

## 2021-04-22 RX ADMIN — ATORVASTATIN CALCIUM 20 MILLIGRAM(S): 80 TABLET, FILM COATED ORAL at 22:25

## 2021-04-22 RX ADMIN — CALCIUM CHLORIDE, MAGNESIUM CHLORIDE, POTASSIUM CHLORIDE, SODIUM ACETATE, SODIUM CHLORIDE, SODIUM CITRATE 1 APPLICATION(S): .48; .3; .75; 3.9; 6.4; 1.7 SOLUTION OPHTHALMIC at 16:18

## 2021-04-22 NOTE — ASU PREOP CHECKLIST - COMMENTS
Had pre surgical 10 oz. clear ensure @ 4:30. famotidine , losartan & amlodipine & synthroid with sip at 0430

## 2021-04-22 NOTE — BRIEF OPERATIVE NOTE - NSICDXBRIEFPROCEDURE_GEN_ALL_CORE_FT
PROCEDURES:  Bronchoscopy, flexible, by CT surgery 22-Apr-2021 10:51:36  Mikhail Zhu  Segmentectomy, lung, using VATS 22-Apr-2021 10:52:00 Right upper lobe Posterior segmentectomy Mikhail Zhu

## 2021-04-23 ENCOUNTER — TRANSCRIPTION ENCOUNTER (OUTPATIENT)
Age: 71
End: 2021-04-23

## 2021-04-23 VITALS
SYSTOLIC BLOOD PRESSURE: 109 MMHG | RESPIRATION RATE: 19 BRPM | HEART RATE: 79 BPM | DIASTOLIC BLOOD PRESSURE: 40 MMHG | OXYGEN SATURATION: 92 %

## 2021-04-23 PROBLEM — Z13.6 SCREENING FOR CARDIOVASCULAR CONDITION: Status: ACTIVE | Noted: 2020-11-18

## 2021-04-23 PROBLEM — Z01.810 PRE-OPERATIVE CARDIOVASCULAR EXAMINATION: Status: ACTIVE | Noted: 2020-11-18

## 2021-04-23 PROBLEM — R06.00 DYSPNEA ON EXERTION: Status: ACTIVE | Noted: 2020-11-18

## 2021-04-23 LAB
ANION GAP SERPL CALC-SCNC: 11 MMOL/L — SIGNIFICANT CHANGE UP (ref 7–14)
BASOPHILS # BLD AUTO: 0 K/UL — SIGNIFICANT CHANGE UP (ref 0–0.2)
BASOPHILS NFR BLD AUTO: 0 % — SIGNIFICANT CHANGE UP (ref 0–2)
BUN SERPL-MCNC: 8 MG/DL — SIGNIFICANT CHANGE UP (ref 7–23)
CALCIUM SERPL-MCNC: 8.9 MG/DL — SIGNIFICANT CHANGE UP (ref 8.4–10.5)
CHLORIDE SERPL-SCNC: 94 MMOL/L — LOW (ref 98–107)
CO2 SERPL-SCNC: 26 MMOL/L — SIGNIFICANT CHANGE UP (ref 22–31)
COVID-19 SPIKE DOMAIN AB INTERP: POSITIVE
COVID-19 SPIKE DOMAIN ANTIBODY RESULT: >250 U/ML — HIGH
CREAT SERPL-MCNC: 0.43 MG/DL — LOW (ref 0.5–1.3)
EOSINOPHIL # BLD AUTO: 0 K/UL — SIGNIFICANT CHANGE UP (ref 0–0.5)
EOSINOPHIL NFR BLD AUTO: 0 % — SIGNIFICANT CHANGE UP (ref 0–6)
GLUCOSE SERPL-MCNC: 157 MG/DL — HIGH (ref 70–99)
HCT VFR BLD CALC: 39.5 % — SIGNIFICANT CHANGE UP (ref 34.5–45)
HGB BLD-MCNC: 13 G/DL — SIGNIFICANT CHANGE UP (ref 11.5–15.5)
IANC: 11.35 K/UL — HIGH (ref 1.5–8.5)
LYMPHOCYTES # BLD AUTO: 0.7 K/UL — LOW (ref 1–3.3)
LYMPHOCYTES # BLD AUTO: 4.7 % — LOW (ref 13–44)
MAGNESIUM SERPL-MCNC: 1.7 MG/DL — SIGNIFICANT CHANGE UP (ref 1.6–2.6)
MANUAL SMEAR VERIFICATION: SIGNIFICANT CHANGE UP
MCHC RBC-ENTMCNC: 28.4 PG — SIGNIFICANT CHANGE UP (ref 27–34)
MCHC RBC-ENTMCNC: 32.9 GM/DL — SIGNIFICANT CHANGE UP (ref 32–36)
MCV RBC AUTO: 86.2 FL — SIGNIFICANT CHANGE UP (ref 80–100)
MONOCYTES # BLD AUTO: 0.83 K/UL — SIGNIFICANT CHANGE UP (ref 0–0.9)
MONOCYTES NFR BLD AUTO: 5.6 % — SIGNIFICANT CHANGE UP (ref 2–14)
NEUTROPHILS # BLD AUTO: 13.09 K/UL — HIGH (ref 1.8–7.4)
NEUTROPHILS NFR BLD AUTO: 87.8 % — HIGH (ref 43–77)
PHOSPHATE SERPL-MCNC: 2.9 MG/DL — SIGNIFICANT CHANGE UP (ref 2.5–4.5)
PLAT MORPH BLD: NORMAL — SIGNIFICANT CHANGE UP
PLATELET # BLD AUTO: 201 K/UL — SIGNIFICANT CHANGE UP (ref 150–400)
PLATELET COUNT - ESTIMATE: NORMAL — SIGNIFICANT CHANGE UP
POTASSIUM SERPL-MCNC: 3 MMOL/L — LOW (ref 3.5–5.3)
POTASSIUM SERPL-MCNC: 3.9 MMOL/L — SIGNIFICANT CHANGE UP (ref 3.5–5.3)
POTASSIUM SERPL-SCNC: 3 MMOL/L — LOW (ref 3.5–5.3)
POTASSIUM SERPL-SCNC: 3.9 MMOL/L — SIGNIFICANT CHANGE UP (ref 3.5–5.3)
RBC # BLD: 4.58 M/UL — SIGNIFICANT CHANGE UP (ref 3.8–5.2)
RBC # FLD: 13.7 % — SIGNIFICANT CHANGE UP (ref 10.3–14.5)
RBC BLD AUTO: NORMAL — SIGNIFICANT CHANGE UP
SARS-COV-2 IGG+IGM SERPL QL IA: >250 U/ML — HIGH
SARS-COV-2 IGG+IGM SERPL QL IA: POSITIVE
SMUDGE CELLS # BLD: PRESENT — SIGNIFICANT CHANGE UP
SODIUM SERPL-SCNC: 131 MMOL/L — LOW (ref 135–145)
VARIANT LYMPHS # BLD: 1.9 % — SIGNIFICANT CHANGE UP (ref 0–6)
WBC # BLD: 14.91 K/UL — HIGH (ref 3.8–10.5)
WBC # FLD AUTO: 14.91 K/UL — HIGH (ref 3.8–10.5)

## 2021-04-23 PROCEDURE — 71045 X-RAY EXAM CHEST 1 VIEW: CPT | Mod: 26

## 2021-04-23 PROCEDURE — 99233 SBSQ HOSP IP/OBS HIGH 50: CPT

## 2021-04-23 PROCEDURE — 71045 X-RAY EXAM CHEST 1 VIEW: CPT | Mod: 26,77

## 2021-04-23 RX ORDER — ACETAMINOPHEN 500 MG
650 TABLET ORAL EVERY 6 HOURS
Refills: 0 | Status: DISCONTINUED | OUTPATIENT
Start: 2021-04-23 | End: 2021-04-23

## 2021-04-23 RX ORDER — MAGNESIUM SULFATE 500 MG/ML
2 VIAL (ML) INJECTION ONCE
Refills: 0 | Status: COMPLETED | OUTPATIENT
Start: 2021-04-23 | End: 2021-04-23

## 2021-04-23 RX ORDER — DORNASE ALFA 1 MG/ML
2.5 SOLUTION RESPIRATORY (INHALATION) DAILY
Refills: 0 | Status: DISCONTINUED | OUTPATIENT
Start: 2021-04-23 | End: 2021-04-23

## 2021-04-23 RX ORDER — ATENOLOL 25 MG/1
25 TABLET ORAL DAILY
Refills: 0 | Status: DISCONTINUED | OUTPATIENT
Start: 2021-04-23 | End: 2021-04-23

## 2021-04-23 RX ORDER — POTASSIUM CHLORIDE 20 MEQ
20 PACKET (EA) ORAL ONCE
Refills: 0 | Status: DISCONTINUED | OUTPATIENT
Start: 2021-04-23 | End: 2021-04-23

## 2021-04-23 RX ORDER — POTASSIUM CHLORIDE 20 MEQ
40 PACKET (EA) ORAL ONCE
Refills: 0 | Status: COMPLETED | OUTPATIENT
Start: 2021-04-23 | End: 2021-04-23

## 2021-04-23 RX ORDER — ACETAMINOPHEN 500 MG
2 TABLET ORAL
Qty: 0 | Refills: 0 | DISCHARGE
Start: 2021-04-23

## 2021-04-23 RX ORDER — OXYCODONE HYDROCHLORIDE 5 MG/1
5 TABLET ORAL
Refills: 0 | Status: DISCONTINUED | OUTPATIENT
Start: 2021-04-23 | End: 2021-04-23

## 2021-04-23 RX ORDER — POTASSIUM CHLORIDE 20 MEQ
10 PACKET (EA) ORAL
Refills: 0 | Status: DISCONTINUED | OUTPATIENT
Start: 2021-04-23 | End: 2021-04-23

## 2021-04-23 RX ORDER — TRAMADOL HYDROCHLORIDE 50 MG/1
1 TABLET ORAL
Qty: 29 | Refills: 0
Start: 2021-04-23 | End: 2021-04-26

## 2021-04-23 RX ORDER — ALBUTEROL 90 UG/1
2.5 AEROSOL, METERED ORAL EVERY 8 HOURS
Refills: 0 | Status: DISCONTINUED | OUTPATIENT
Start: 2021-04-23 | End: 2021-04-23

## 2021-04-23 RX ORDER — SODIUM CHLORIDE 9 MG/ML
4 INJECTION INTRAMUSCULAR; INTRAVENOUS; SUBCUTANEOUS EVERY 8 HOURS
Refills: 0 | Status: DISCONTINUED | OUTPATIENT
Start: 2021-04-23 | End: 2021-04-23

## 2021-04-23 RX ADMIN — Medication 1 DROP(S): at 02:00

## 2021-04-23 RX ADMIN — HEPARIN SODIUM 5000 UNIT(S): 5000 INJECTION INTRAVENOUS; SUBCUTANEOUS at 05:31

## 2021-04-23 RX ADMIN — Medication 40 MILLIEQUIVALENT(S): at 06:45

## 2021-04-23 RX ADMIN — Medication 650 MILLIGRAM(S): at 11:52

## 2021-04-23 RX ADMIN — HYDROMORPHONE HYDROCHLORIDE 30 MILLILITER(S): 2 INJECTION INTRAMUSCULAR; INTRAVENOUS; SUBCUTANEOUS at 07:07

## 2021-04-23 RX ADMIN — Medication 50 GRAM(S): at 06:45

## 2021-04-23 RX ADMIN — ALBUTEROL 2.5 MILLIGRAM(S): 90 AEROSOL, METERED ORAL at 08:57

## 2021-04-23 RX ADMIN — Medication 40 MILLIEQUIVALENT(S): at 11:33

## 2021-04-23 RX ADMIN — Medication 650 MILLIGRAM(S): at 13:30

## 2021-04-23 RX ADMIN — Medication 100 MICROGRAM(S): at 05:31

## 2021-04-23 RX ADMIN — SODIUM CHLORIDE 4 MILLILITER(S): 9 INJECTION INTRAMUSCULAR; INTRAVENOUS; SUBCUTANEOUS at 08:59

## 2021-04-23 RX ADMIN — DORNASE ALFA 2.5 MILLIGRAM(S): 1 SOLUTION RESPIRATORY (INHALATION) at 09:00

## 2021-04-23 RX ADMIN — Medication 1000 UNIT(S): at 11:33

## 2021-04-23 NOTE — DISCHARGE NOTE PROVIDER - HOSPITAL COURSE
71 yo female with preop dx. solitary pulmonary nodule presents to pre surgical testing.  Pt s/p LULobectomy 12/2020 revealing malignancy.  Follow up CT Chest showed right lung nodule had increased in size.  Pt underwent flexible bronchoscopy, right video assisted thoracoscopy, right upper lobe posterior segmentectomy on 04/22/21. Postop period was uneventful and she was discharged home next day.

## 2021-04-23 NOTE — DISCHARGE NOTE PROVIDER - NSDCMRMEDTOKEN_GEN_ALL_CORE_FT
acetaminophen 325 mg oral tablet: 2 tab(s) orally every 6 hours, As needed, Mild Pain (1 - 3)  amLODIPine 5 mg oral tablet: 1 tab(s) orally once a day  atenolol 25 mg oral tablet: 1 tab(s) orally once a day  atorvastatin 20 mg oral tablet: 1 tab(s) orally once a day  calcium: 600 milligram(s) orally once a day  Centrum Adults oral tablet: 1 tab(s) orally once a day, last dose 4/15/21  levothyroxine 100 mcg (0.1 mg) oral tablet: 1 tab(s) orally once a day  losartan-hydrochlorothiazide 100 mg-25 mg oral tablet: 1 tab(s) orally once a day  Omega 3 acid esters: 1 gram(s) orally once a day, last dose  4/15/21  potassium chloride 10 mEq oral tablet, extended release: orally once a day  Ultram 50 mg oral tablet: 1 tab(s) orally every 6 hours, As Needed -for severe pain MDD:3   Vitamin D3 1000 intl units (25 mcg) oral tablet: 1 tab(s) orally once a day

## 2021-04-23 NOTE — DISCHARGE NOTE PROVIDER - CARE PROVIDER_API CALL
[facility])  Surgery; Thoracic Surgery  [phone] [place] [address], [place] [address]  Phone: [phone]  Fax: [phone]  Follow Up Time:

## 2021-04-23 NOTE — DISCHARGE NOTE NURSING/CASE MANAGEMENT/SOCIAL WORK - PATIENT PORTAL LINK FT
You can access the FollowMyHealth Patient Portal offered by [facility] by registering at the following website: [email or web address]. By joining [facility]’s FollowMyHealth portal, you will also be able to view your health information using other applications (apps) compatible with our system.

## 2021-04-23 NOTE — PHYSICAL THERAPY INITIAL EVALUATION ADULT - PERTINENT HX OF CURRENT PROBLEM, REHAB EVAL
70 year old female with preop dx. solitary pulmonary nodule presents to pre surgical testing.  Pt s/p LULobectomy 12/2020 revealing malignancy.  Follow up CT Chest showed right lung nodule had increased in size.

## 2021-04-23 NOTE — DISCHARGE NOTE NURSING/CASE MANAGEMENT/SOCIAL WORK - NSDCFUADDAPPT_GEN_ALL_CORE_FT
Follow up with Dr. Salmon in 12-14 days. Call Thoracic Surgery office [phone] to schedule an appointment. Please, obtain a CXR 1-2 days prior to your appointment and bring a copy with you.  Please, make an appointment with your PMD within 7 days.  Blue Stitch will be removed by Dr. Salmon in the office.  You may shower in 24 hours with dressing and remove in the shower. Keep the wound clean and dry it well after showering.  It’s OK if some fluid comes out of the chest tube hole unless blood or purulent.  No Driving while taking pain meds. Some coughing and discomfort is expected.

## 2021-04-23 NOTE — DISCHARGE NOTE PROVIDER - NSDCFUADDAPPT_GEN_ALL_CORE_FT
Follow up with Dr. Salmon in 12-14 days. Call Thoracic Surgery office [phone] to schedule an appointment. Please, obtain a CXR 1-2 days prior to your appointment and bring a copy with you.  Please, make an appointment with your PMD within 7 days.  Blue Stitch will be removed by Dr. Salmon in the office.  You may shower in 24 hours with dressing and remove in the shower. Keep the wound clean and dry it well after showering.  It’s OK if some fluid comes out of the chest tube hole unless blood or purulent.  No Driving while taking pain meds. Some coughing and discomfort is expected.

## 2021-04-23 NOTE — DISCHARGE NOTE PROVIDER - NSDCCPTREATMENT_GEN_ALL_CORE_FT
PRINCIPAL PROCEDURE  Procedure: Segmentectomy, lung, using VATS  Findings and Treatment: RUL posterior

## 2021-04-23 NOTE — PROGRESS NOTE ADULT - SUBJECTIVE AND OBJECTIVE BOX
Anesthesia Pain Management Service    SUBJECTIVE: Patient is doing well with IV PCA and no significant problems reported.    Pain Scale Score	At rest: _5/10__ 	With Activity: ___ 	[X ] Refer to charted pain scores    THERAPY:    [ ] IV PCA Morphine		[ ] 5 mg/mL	[ ] 1 mg/mL  [X ] IV PCA Hydromorphone	[ ] 5 mg/mL	[X ] 1 mg/mL  [ ] IV PCA Fentanyl		[ ] 50 micrograms/mL    Demand dose __0.2_ lockout __6_ (minutes) Continuous Rate _0__ Total: _2.8_   mg used (in past 24 hrs)      MEDICATIONS  (STANDING):  ALBUTerol    0.083% 2.5 milliGRAM(s) Nebulizer every 8 hours  ATENolol  Tablet 25 milliGRAM(s) Oral daily  atorvastatin 20 milliGRAM(s) Oral at bedtime  cholecalciferol 1000 Unit(s) Oral daily  dornase grzegorz Solution 2.5 milliGRAM(s) Inhalation daily  heparin   Injectable 5000 Unit(s) SubCutaneous every 8 hours  lactated ringers. 1000 milliLiter(s) (30 mL/Hr) IV Continuous <Continuous>  levothyroxine 100 MICROGram(s) Oral daily  potassium chloride    Tablet ER 40 milliEquivalent(s) Oral once  senna 2 Tablet(s) Oral at bedtime  sodium chloride 3%  Inhalation 4 milliLiter(s) Inhalation every 8 hours    MEDICATIONS  (PRN):  naloxone Injectable 0.1 milliGRAM(s) IV Push every 3 minutes PRN For ANY of the following changes in patient status:  A. RR LESS THAN 10 breaths per minute, B. Oxygen saturation LESS THAN 90%, C. Sedation score of 6  ondansetron Injectable 4 milliGRAM(s) IV Push every 6 hours PRN Nausea  oxyCODONE    IR 5 milliGRAM(s) Oral every 3 hours PRN Moderate to Severe Pain (4 - 10)      OBJECTIVE:  Patient is ambulating around unit.    Sedation Score:	[ X] Alert	[ ] Drowsy 	[ ] Arousable	[ ] Asleep	[ ] Unresponsive    Side Effects:	[X ] None	[ ] Nausea	[ ] Vomiting	[ ] Pruritus  		[ ] Other:    Vital Signs Last 24 Hrs  T(C): 36.9 (23 Apr 2021 08:00), Max: 36.9 (23 Apr 2021 04:00)  T(F): 98.5 (23 Apr 2021 08:00), Max: 98.5 (23 Apr 2021 04:00)  HR: 80 (23 Apr 2021 08:57) (64 - 87)  BP: 109/39 (23 Apr 2021 08:00) (101/50 - 138/49)  BP(mean): 56 (23 Apr 2021 08:00) (54 - 75)  RR: 24 (23 Apr 2021 08:00) (14 - 28)  SpO2: 97% (23 Apr 2021 08:57) (91% - 98%)    ASSESSMENT/ PLAN    Therapy to  be:	[ ] Continue   [ X] Discontinued   [X ] Change to prn Analgesics    Documentation and Verification of current medications:   [X] Done	[ ] Not done, not elligible    Comments: IV Dilaudid PCA discontinued. Yellow tubing noted on IV PCA, discussed with charge nurse, QI will be done.  Will inform Anesthesia Pain Attending.  PRN Oral/IV opioids and/or Adjuvant non-opioid medication to be ordered at this point.    Progress Note written now but Patient was seen earlier.
Day _2__ of Anesthesia Pain Management Service    SUBJECTIVE:    Therapy:	  [x ] IV PCA	   [ ] Epidural           [ ] s/p Spinal Opoid              [ ] Postpartum infusion	  [ ] Patient controlled regional anesthesia (PCRA)    [ ] prn Analgesics    OBJECTIVE:   [x ] No new signs     [ ] Other:    Side Effects:  [ x] None			[ ] Other:    Assessment of Catheter Site:		[ ] Intact		[ ] Other:    ASSESSMENT/PLAN  [ ] Continue current therapy    [x ] Therapy changed to:    [ ] IV PCA       [ ] Epidural     [ x] prn Analgesics     Comments:
JESSE SHAW      70y   Female   [id number]         No Known Allergies             Daily     Daily Drug Dosing Weight  Height (cm): 160 (2021 05:50)  Weight (kg): 95.3 (2021 05:50)  BMI (kg/m2): 37.2 (2021 05:50)  BSA (m2): 1.97 (2021 05:50)    HPI:  69 yo female with preop dx. solitary pulmonary nodule presents to pre surgical testing.  Pt s/p LULobectomy 2020 revealing malignancy.  Follow up CT Chest showed right lung nodule had increased in size.  Pt is scheduled for flexible bronchoscopy, right video assisted thoracoscopy, lung resection.   (15 Apr 2021 13:13)      Procedure: RVATS Right upper lobe Posterior segmentectomy  2021                       Issues:  Lung nodule  Post op pain  S/P LAINE lobectomy (2020)  HTN  HLD  Obesity  Hx of Polio with RLE weakness -- uses cane  Hx of lung cancer   Spinal stenosis  Hypokalemia                 Home Medications:  amLODIPine 5 mg oral tablet: 1 tab(s) orally once a day (2021 06:16)  atenolol 25 mg oral tablet: 1 tab(s) orally once a day (2021 06:16)  atorvastatin 20 mg oral tablet: 1 tab(s) orally once a day (2021 06:16)  calcium: 600 milligram(s) orally once a day (2021 06:16)  Centrum Adults oral tablet: 1 tab(s) orally once a day, last dose 4/15/21 (2021 06:16)  levothyroxine 100 mcg (0.1 mg) oral tablet: 1 tab(s) orally once a day (2021 06:16)  losartan-hydrochlorothiazide 100 mg-25 mg oral tablet: 1 tab(s) orally once a day (2021 06:16)  Omega 3 acid esters: 1 gram(s) orally once a day, last dose  4/15/21 (2021 06:16)  potassium chloride 10 mEq oral tablet, extended release: orally once a day (2021 06:16)  Vitamin D3 1000 intl units (25 mcg) oral tablet: 1 tab(s) orally once a day (2021 06:16)      PAST MEDICAL & SURGICAL HISTORY:  Dyslipidemia    HTN (hypertension)    Hypothyroidism    Obesity    Diverticulosis    h/o Polio in childhood  RLE weakness, uses cane to ambulate    Chronic gout    Spinal stenosis    S/P lumbar laminectomy    Arthritis    Spondylolisthesis    History of macular degeneration    Solitary pulmonary nodule    Lung cancer  left    Thyroid nodule    s/p right knee arthroscopy for meniscus tear  ,     S/P bilateral salpingo-oophorectomy    secondary to ovarian cyst    S/P D&amp;C  2004    S/P  Section  ,     surgical repair of left leg growth plate  secondary to polio      surgical repair of right foot  secondary to polio      H/O partial thyroidectomy      H/O lumbar L5-L6 laminectomy  2003    umbilical Hernia repair with mesh  2010    S/P TKR (total knee replacement)  Left     S/P cataract surgery  2014    History of lung surgery  LULobectomy 2020      Vital Signs Last 24 Hrs  T(C): 36.9 (2021 08:00), Max: 36.9 (2021 04:00)  T(F): 98.5 (2021 08:00), Max: 98.5 (2021 04:00)  HR: 87 (2021 11:00) (64 - 89)  BP: 115/46 (2021 11:00) (101/50 - 138/49)  BP(mean): 63 (2021 11:00) (54 - 73)  RR: 22 (2021 11:00) (14 - 28)  SpO2: 97% (2021 11:00) (91% - 98%)  I&O's Detail    2021 07:01  -  2021 07:00  --------------------------------------------------------  IN:    IV PiggyBack: 50 mL    Lactated Ringers: 600 mL  Total IN: 650 mL    OUT:    Chest Tube (mL): 145 mL    Voided (mL): 750 mL  Total OUT: 895 mL    Total NET: -245 mL      2021 07:01  -  2021 11:40  --------------------------------------------------------  IN:    Lactated Ringers: 30 mL    Oral Fluid: 240 mL  Total IN: 270 mL    OUT:    Chest Tube (mL): 0 mL  Total OUT: 0 mL    Total NET: 270 mL        CAPILLARY BLOOD GLUCOSE        Home Medications:  amLODIPine 5 mg oral tablet: 1 tab(s) orally once a day (2021 06:16)  atenolol 25 mg oral tablet: 1 tab(s) orally once a day (2021 06:16)  atorvastatin 20 mg oral tablet: 1 tab(s) orally once a day (2021 06:16)  calcium: 600 milligram(s) orally once a day (2021 06:16)  Centrum Adults oral tablet: 1 tab(s) orally once a day, last dose 4/15/21 (2021 06:16)  levothyroxine 100 mcg (0.1 mg) oral tablet: 1 tab(s) orally once a day (2021 06:16)  losartan-hydrochlorothiazide 100 mg-25 mg oral tablet: 1 tab(s) orally once a day (2021 06:16)  Omega 3 acid esters: 1 gram(s) orally once a day, last dose  4/15/21 (2021 06:16)  potassium chloride 10 mEq oral tablet, extended release: orally once a day (2021 06:16)  Vitamin D3 1000 intl units (25 mcg) oral tablet: 1 tab(s) orally once a day (2021 06:16)      MEDICATIONS  (STANDING):  ALBUTerol    0.083% 2.5 milliGRAM(s) Nebulizer every 8 hours  ATENolol  Tablet 25 milliGRAM(s) Oral daily  atorvastatin 20 milliGRAM(s) Oral at bedtime  cholecalciferol 1000 Unit(s) Oral daily  dornase grzegorz Solution 2.5 milliGRAM(s) Inhalation daily  heparin   Injectable 5000 Unit(s) SubCutaneous every 8 hours  lactated ringers. 1000 milliLiter(s) (30 mL/Hr) IV Continuous <Continuous>  levothyroxine 100 MICROGram(s) Oral daily  senna 2 Tablet(s) Oral at bedtime  sodium chloride 3%  Inhalation 4 milliLiter(s) Inhalation every 8 hours    MEDICATIONS  (PRN):  acetaminophen   Tablet .. 650 milliGRAM(s) Oral every 6 hours PRN Mild Pain (1 - 3)  naloxone Injectable 0.1 milliGRAM(s) IV Push every 3 minutes PRN For ANY of the following changes in patient status:  A. RR LESS THAN 10 breaths per minute, B. Oxygen saturation LESS THAN 90%, C. Sedation score of 6  ondansetron Injectable 4 milliGRAM(s) IV Push every 6 hours PRN Nausea  oxyCODONE    IR 5 milliGRAM(s) Oral every 3 hours PRN Moderate to Severe Pain (4 - 10)          Physical exam:                             General:               Pt is awake, alert,  appears to be in pain but not in  distress                                                  Neuro:                  Nonfocal                             Cardiovascular:   S1 & S2, regular                           Respiratory:         Air entry is fair and equal on both sides, has bilateral conducted sounds                           GI:                          Soft, nondistended and nontender, Bowel sounds active                            Ext:                        No cyanosis or edema     Labs:                                                                           13.0   14.91 )-----------( 201      ( 2021 04:43 )             39.5             04-    131<L>  |  94<L>  |  8   ----------------------------<  157<H>  3.0<L>   |  26  |  0.43<L>    Ca    8.9      2021 04:43  Phos  2.9       Mg     1.7           CXR:    < from: Xray Chest 1 View- PORTABLE-Routine (Xray Chest 1 View- PORTABLE-Routine in AM.) (21 @ 06:18) >  Right-sided chest tube in place unchanged. Increasing right perihilar opacity with chain sutures overlying this region likely postop atelectasis.  Remainder of both lungs are clear. Tiny right apical pneumothorax.    COMPARISON:      IMPRESSION:  Post right thoracotomy with chest tube and increasing postop opacity in the region of surgery.        Plan:    General: 70yFemale s/p RVATS Right upper lobe Posterior segmentectomy  2021, experiencing  pain with deep breathing.                             Neuro:                                         Pain control with Tramadol /  Tylenol PRN                            Cardiovascular:                                          HTN: Continue hemodynamic monitoring, restarted Atenolol    HLD: On Lipitor                            Respiratory:                                         Pt is on RA                                         Comfortable, not in any distress.                                         Encourage incentive spirometry                                          Chest tube D/CD.  Pneumo slightly increased after CT removal. Repeat CXR                                                               Continue bronchodilators, pulmonary toilet                            GI                                         On  DASH regular diet as tolerated                                         Continue Zofran / Reglan for nausea - PRN	                                                                 Renal:                                         Hypokalemia: Pt received 80meq of K. Check K level                                         Monitor I/Os and electrolytes                                                                                        Hem/ Onc:                                                                                  No  signs of bleeding.                                          Follow CBC in AM                           Infectious disease:                                            Monitor for fever / leukocytosis.                                          All surgical incision / chest tube  sites look clean                            Endocrine                                            Continue Accu-Checks with coverage    Pt is on SQ Heparin and Venodyne boots for DVT prophylaxis.     Pertinent clinical, laboratory, radiographic, hemodynamic, echocardiographic, respiratory data, microbiologic data and chart were reviewed and analyzed frequently throughout the course of the day and night  Patient seen, examined and plan discussed with CT Surgeon Dr. Salmon / CTICU team during rounds.    Status discussed with patient /  updated plan of care             Herb Schwarz MD                                                                    
    PROCEDURE: R VATS Right upper lobe Posterior segmentectomy  22-Apr-2021       ISSUES:   Lung nodule  Post op pain  Chest tube in place  S/P LAINE lobectomy (12/2020)  HTN  HLD  Obesity  Hx of Polio with RLE weakness -- uses cane  Hx of lung cancer   Spinal stenosis      INTERVAL EVENTS:   OR today. Extubated in OR. Transferred to CTICU.      HISTORY:   Patient reports moderate pain at chest wall incision sites which is worse with coughing and deep breathing without associated fever or dyspnea. Pain is improved with use of pain meds.     PHYSICAL EXAM:   Gen: Comfortable, No acute distress  Eyes: Sclera white, Conjunctiva normal, Eyelids normal, Pupils symmetrical   ENT: Mucous membranes moist,  ,  ,    Neck: Trachea midline,  ,  ,  ,  ,    CV: Rate regular, Rhythm regular,  ,  ,    Resp: Breath sounds clear, No accessory muscles use, R chest tube in place,    Abd: Soft, Non-distended, Non-tender, Bowel sounds normal,  ,  ,    Skin: Warm, No peripheral edema of lower extremities,  ,    : No duran  Neuro: Moving all 4 extremities,    Psych: A&Ox3      ASSESSMENT AND PLAN:     NEURO:  Post-operative Pain - Pain control with PCA and Tylenol IV PRN.        RESPIRATORY:  Hypoxia - Wean nasal cannula for goal O2sat above 92. Obtain CXR. Incentive spirometry. Chest PT and frequent suctioning. Continue bronchodilators. OOB to chair & ambulate w/ assistance. Continuous pulse oximetry for support & to prevent decompensation.       Chest tube – Pleurevac water seal. Monitor chest tube output.         CARDIOVASCULAR:  Hemodynamically stable - Not on pressors. Continue hemodynamic monitoring.  HTN - stable. Hold home antihypertensives for now.                  RENAL:  Stable - Monitor IOs and electrolytes. Keep K above 4.0 and Mg above 2.0.         GASTROINTESTINAL:  GI prophylaxis not indicated  Zofran and Reglan IV PRN for nausea  Regular consistency diet          HEMATOLOGIC:  No signs of active bleeding. Monitor Hgb in CBC in AM  DVT prophylaxis with heparin subQ and SCDs.         INFECTIOUS DISEASE:  All surgical sites appear clean. No signs of active infection. Will monitor for fever and leukocytosis.          ENDOCRINE:  Stable – Monitor glucose fingersticks for goal 120-180.  Hypothyroidism - stable. Continue Synthroid.         Pertinent clinical, laboratory, radiographic, hemodynamic, echocardiographic, respiratory data, microbiologic data and chart were reviewed by myself and analyzed frequently throughout the course of the day and night by myself.    Plan discussed at length with the CTICU staff and Attending CT Surgeon.     Patient's status was discussed with patient at bedside.           ________________________________________________        _________________________  VITAL SIGNS:  Vital Signs Last 24 Hrs  T(C): 36.6 (22 Apr 2021 10:55), Max: 37.1 (22 Apr 2021 05:50)  T(F): 97.8 (22 Apr 2021 10:55), Max: 98.8 (22 Apr 2021 05:50)  HR: 65 (22 Apr 2021 12:30) (64 - 78)  BP: 103/47 (22 Apr 2021 12:30) (103/47 - 145/58)  BP(mean): 62 (22 Apr 2021 12:30) (60 - 75)  RR: 14 (22 Apr 2021 12:30) (14 - 19)  SpO2: 96% (22 Apr 2021 12:30) (93% - 98%)  I/Os:   I&O's Detail          MEDICATIONS:  MEDICATIONS  (STANDING):  atorvastatin 20 milliGRAM(s) Oral at bedtime  cholecalciferol 1000 Unit(s) Oral daily  heparin   Injectable 5000 Unit(s) SubCutaneous every 8 hours  HYDROmorphone PCA (1 mG/mL) 30 milliLiter(s) PCA Continuous PCA Continuous  lactated ringers. 1000 milliLiter(s) (30 mL/Hr) IV Continuous <Continuous>  levothyroxine 100 MICROGram(s) Oral daily  senna 2 Tablet(s) Oral at bedtime    MEDICATIONS  (PRN):  HYDROmorphone PCA (1 mG/mL) Rescue Clinician Bolus 0.5 milliGRAM(s) IV Push every 15 minutes PRN for Pain Scale GREATER THAN 6  naloxone Injectable 0.1 milliGRAM(s) IV Push every 3 minutes PRN For ANY of the following changes in patient status:  A. RR LESS THAN 10 breaths per minute, B. Oxygen saturation LESS THAN 90%, C. Sedation score of 6  ondansetron Injectable 4 milliGRAM(s) IV Push every 6 hours PRN Nausea      LABS:                  _________________________

## 2021-04-26 PROBLEM — E04.1 NONTOXIC SINGLE THYROID NODULE: Chronic | Status: ACTIVE | Noted: 2021-04-15

## 2021-04-26 PROBLEM — C34.90 MALIGNANT NEOPLASM OF UNSPECIFIED PART OF UNSPECIFIED BRONCHUS OR LUNG: Chronic | Status: ACTIVE | Noted: 2021-04-15

## 2021-04-27 LAB — SURGICAL PATHOLOGY STUDY: SIGNIFICANT CHANGE UP

## 2021-05-04 ENCOUNTER — APPOINTMENT (OUTPATIENT)
Dept: THORACIC SURGERY | Facility: CLINIC | Age: 71
End: 2021-05-04
Payer: MEDICARE

## 2021-05-04 ENCOUNTER — OUTPATIENT (OUTPATIENT)
Dept: OUTPATIENT SERVICES | Facility: HOSPITAL | Age: 71
LOS: 1 days | End: 2021-05-04
Payer: MEDICARE

## 2021-05-04 ENCOUNTER — APPOINTMENT (OUTPATIENT)
Dept: RADIOLOGY | Facility: HOSPITAL | Age: 71
End: 2021-05-04

## 2021-05-04 ENCOUNTER — RESULT REVIEW (OUTPATIENT)
Age: 71
End: 2021-05-04

## 2021-05-04 ENCOUNTER — NON-APPOINTMENT (OUTPATIENT)
Age: 71
End: 2021-05-04

## 2021-05-04 VITALS
OXYGEN SATURATION: 95 % | RESPIRATION RATE: 18 BRPM | BODY MASS INDEX: 36.32 KG/M2 | DIASTOLIC BLOOD PRESSURE: 85 MMHG | SYSTOLIC BLOOD PRESSURE: 139 MMHG | HEART RATE: 92 BPM | TEMPERATURE: 98.5 F | WEIGHT: 205 LBS | HEIGHT: 63 IN

## 2021-05-04 DIAGNOSIS — Z98.890 OTHER SPECIFIED POSTPROCEDURAL STATES: Chronic | ICD-10-CM

## 2021-05-04 DIAGNOSIS — R91.1 SOLITARY PULMONARY NODULE: ICD-10-CM

## 2021-05-04 DIAGNOSIS — Z98.49 CATARACT EXTRACTION STATUS, UNSPECIFIED EYE: Chronic | ICD-10-CM

## 2021-05-04 DIAGNOSIS — Z96.659 PRESENCE OF UNSPECIFIED ARTIFICIAL KNEE JOINT: Chronic | ICD-10-CM

## 2021-05-04 PROCEDURE — 71046 X-RAY EXAM CHEST 2 VIEWS: CPT | Mod: 26

## 2021-05-04 PROCEDURE — 99024 POSTOP FOLLOW-UP VISIT: CPT

## 2021-07-09 ENCOUNTER — OUTPATIENT (OUTPATIENT)
Dept: OUTPATIENT SERVICES | Facility: HOSPITAL | Age: 71
LOS: 1 days | End: 2021-07-09
Payer: COMMERCIAL

## 2021-07-09 ENCOUNTER — APPOINTMENT (OUTPATIENT)
Dept: ULTRASOUND IMAGING | Facility: IMAGING CENTER | Age: 71
End: 2021-07-09
Payer: MEDICARE

## 2021-07-09 ENCOUNTER — APPOINTMENT (OUTPATIENT)
Dept: MAMMOGRAPHY | Facility: IMAGING CENTER | Age: 71
End: 2021-07-09
Payer: MEDICARE

## 2021-07-09 DIAGNOSIS — Z98.49 CATARACT EXTRACTION STATUS, UNSPECIFIED EYE: Chronic | ICD-10-CM

## 2021-07-09 DIAGNOSIS — Z96.659 PRESENCE OF UNSPECIFIED ARTIFICIAL KNEE JOINT: Chronic | ICD-10-CM

## 2021-07-09 DIAGNOSIS — Z98.890 OTHER SPECIFIED POSTPROCEDURAL STATES: Chronic | ICD-10-CM

## 2021-07-09 DIAGNOSIS — R92.8 OTHER ABNORMAL AND INCONCLUSIVE FINDINGS ON DIAGNOSTIC IMAGING OF BREAST: ICD-10-CM

## 2021-07-09 DIAGNOSIS — Z01.419 ENCOUNTER FOR GYNECOLOGICAL EXAMINATION (GENERAL) (ROUTINE) WITHOUT ABNORMAL FINDINGS: ICD-10-CM

## 2021-07-09 DIAGNOSIS — Z00.8 ENCOUNTER FOR OTHER GENERAL EXAMINATION: ICD-10-CM

## 2021-07-09 PROCEDURE — 76641 ULTRASOUND BREAST COMPLETE: CPT | Mod: 26,50

## 2021-07-09 PROCEDURE — 77063 BREAST TOMOSYNTHESIS BI: CPT

## 2021-07-09 PROCEDURE — 77067 SCR MAMMO BI INCL CAD: CPT | Mod: 26

## 2021-07-09 PROCEDURE — 77063 BREAST TOMOSYNTHESIS BI: CPT | Mod: 26

## 2021-07-09 PROCEDURE — 77067 SCR MAMMO BI INCL CAD: CPT

## 2021-07-09 PROCEDURE — 76641 ULTRASOUND BREAST COMPLETE: CPT

## 2021-07-13 ENCOUNTER — APPOINTMENT (OUTPATIENT)
Dept: ORTHOPEDIC SURGERY | Facility: CLINIC | Age: 71
End: 2021-07-13
Payer: MEDICARE

## 2021-07-13 PROCEDURE — 20610 DRAIN/INJ JOINT/BURSA W/O US: CPT | Mod: RT

## 2021-07-13 PROCEDURE — 99213 OFFICE O/P EST LOW 20 MIN: CPT | Mod: 25

## 2021-07-13 PROCEDURE — 73564 X-RAY EXAM KNEE 4 OR MORE: CPT | Mod: LT

## 2021-07-13 NOTE — PHYSICAL EXAM
[FreeTextEntry2] : This patient has a total knee replacement on the left.  As far as her total knee she goes from 0 to 120 degrees with good medial lateral and posterior stability and the patella tracks a very well she has no pain in this knee.\par \par Her right knee is giving her some pain but in this knee as her polio leg she wears a orthotic in her lower leg however she does not have the power need to extend or flex and she is able to walk with her cane and she puts her knee in full extension and locks it if she walks.  She is having diffuse discomfort however now in her knee.  \par \par  [de-identified] : 4 views were taken of the patient's left knee and knee show excellent position of fixation of the [employer] cemented total knee replacement there is no evidence of osteolysis or loosening and the patella tracks well.  \par \par 4 views were taken of her right knee here the bone is thinner secondary to the polio and she does have what appears to be bilateral chondrocalcinosis does have marked patella baja.  She also has an area of the proximal fibula which appears to be healing from a previous fracture.  \par \par \par

## 2021-07-13 NOTE — DISCUSSION/SUMMARY
[de-identified] : Therapeutic decision making\par \par This patient is doing extremely well with her left knee and still able to walk using a cane for her right polio leg she wears an orthotic in her right foot however does not have motor power of her right knee she has learned to manage with it she does have chondrocalcinosis in her knee as well as osteoarthritis however her general alignment remains satisfactory.  The patella is a patella Baha.  She is managing with ambulation however now having some pain she is given a local injection with Celestone with a good initial response.  She will try to continue with working with her right and knee as it is as she is done for so many years.  Return visit in 3 to 6 months or as needed.

## 2021-07-13 NOTE — PROCEDURE
[de-identified] : Procedure Note:\par \par Anatomic Location:  Right  Knee\par \par Diagnosis:  Arthritis\par \par Procedure:  Injection of 2cc  of Marcaine 0.25% plain and Celestone 1cc, 6mg\par \par Local Spray: Ethyl Chloride.\par \par \par Patient has consented for the procedure.\par \par Injection  through a lateral parapatella approach.\par \par Patient tolerated the procedure well.\par \par Patient instructed to call the office if any reaction, fever, chills, increased erythema or swelling.   [phone].

## 2021-07-13 NOTE — HISTORY OF PRESENT ILLNESS
[de-identified] : 69 y/o female s/p L TKR 10/8/12 doing well.\par No pain or complaints about the left knee.\par She is managing well with her day-to-day activities. \par However, her right knee has been hurting her recently, due to chronic OA, for which she took oral Prednisone, which helped manage the pain.

## 2021-09-17 ENCOUNTER — APPOINTMENT (OUTPATIENT)
Dept: CT IMAGING | Facility: CLINIC | Age: 71
End: 2021-09-17

## 2021-09-21 ENCOUNTER — APPOINTMENT (OUTPATIENT)
Dept: THORACIC SURGERY | Facility: CLINIC | Age: 71
End: 2021-09-21
Payer: MEDICARE

## 2021-10-13 ENCOUNTER — OUTPATIENT (OUTPATIENT)
Dept: OUTPATIENT SERVICES | Facility: HOSPITAL | Age: 71
LOS: 1 days | End: 2021-10-13
Payer: COMMERCIAL

## 2021-10-13 ENCOUNTER — APPOINTMENT (OUTPATIENT)
Dept: CT IMAGING | Facility: IMAGING CENTER | Age: 71
End: 2021-10-13
Payer: MEDICARE

## 2021-10-13 DIAGNOSIS — Z98.890 OTHER SPECIFIED POSTPROCEDURAL STATES: Chronic | ICD-10-CM

## 2021-10-13 DIAGNOSIS — C34.92 MALIGNANT NEOPLASM OF UNSPECIFIED PART OF LEFT BRONCHUS OR LUNG: ICD-10-CM

## 2021-10-13 DIAGNOSIS — Z98.49 CATARACT EXTRACTION STATUS, UNSPECIFIED EYE: Chronic | ICD-10-CM

## 2021-10-13 DIAGNOSIS — Z96.659 PRESENCE OF UNSPECIFIED ARTIFICIAL KNEE JOINT: Chronic | ICD-10-CM

## 2021-10-13 PROCEDURE — 71250 CT THORAX DX C-: CPT

## 2021-10-13 PROCEDURE — 71250 CT THORAX DX C-: CPT | Mod: 26

## 2021-10-19 ENCOUNTER — APPOINTMENT (OUTPATIENT)
Dept: THORACIC SURGERY | Facility: CLINIC | Age: 71
End: 2021-10-19
Payer: MEDICARE

## 2021-10-19 VITALS
HEIGHT: 63 IN | OXYGEN SATURATION: 96 % | WEIGHT: 205 LBS | HEART RATE: 90 BPM | TEMPERATURE: 98.3 F | SYSTOLIC BLOOD PRESSURE: 148 MMHG | RESPIRATION RATE: 18 BRPM | DIASTOLIC BLOOD PRESSURE: 84 MMHG | BODY MASS INDEX: 36.32 KG/M2

## 2021-10-19 PROCEDURE — 99214 OFFICE O/P EST MOD 30 MIN: CPT

## 2021-10-19 NOTE — CONSULT LETTER
[FreeTextEntry2] : Dr. Arslan Larios (Pulm/Ref)  [FreeTextEntry3] : North Salmon MD, FACS \par Chief, Division of Thoracic Surgery \par Director, Minimally Invasive Thoracic Surgery \par Department of Cardiovascular and Thoracic Surgery \par [facility] \par , Cardiovascular and Thoracic Surgery\par \par \par

## 2021-10-19 NOTE — ASSESSMENT
[FreeTextEntry1] : Ms. Gaona is a 70 year old female. She is a former smoker (20 years, 1.5 PPD, Quit 1992) w/ hx of HTN, poliomyelitis, and partial thyroidectomy. Initially presented to office in November, 2020 for surgical evaluation of enlarging Left lung nodule. \par \par 12/3/2020: s/p Flexible bronch, uniportal Lt VATS, wedge rsx of LAINE x1, lingula sparing LULobectomy, MLND; Path of LAINE wedge resection revealing Invasive adenocarcinoma, acinar predominant with papillary subtype present; 2.4 cm; G2; All margins and all (0/23) lymph nodes negative; pT1c pN0 (Stage IA3). Path of LULobectomy: Negative for malignancy; multiple meningotheliod nodules. \par \par Now, s/p Flex bronch; uniportal Right VATS; RUL posterior segmentectomy; mediastinal and hilar lymph node dissection; intercostal nerve block on 4/22/21. Path revealing Adenocarcinoma, acinar predominant; G2; 1.1cm; All margins and LN (0/26) negative for tumor. pT1b pN0- Stage IA2. The presence of a lepidic pattern (AIS) indicates that this is a new primary adenocarcinoma of the lung.\par \par I have independently reviewed the medical records and imaging at the time of this office consultation, and discussed the following interpretations and recommendations with the patient:\par 1. CT chest reviewed and explained to patient, stable scan. I recommended patient to return to office in 3 months with CXR.\par   \par Recommendations reviewed with patient during this office visit, and all questions answered; Patient instructed on the importance of follow up and verbalizes understanding.\par \par I personally performed the services described in the documentation, reviewed the documentation recorded by the scribe in my presence and it accurately and completely records my words and actions.\par \par I, SUGAR Sow, am scribing for and in the presence of ALDEN Cast, the following sections HISTORY OF PRESENT ILLNESS, PAST MEDICAL/FAMILY/SOCIAL HISTORY; REVIEW OF SYSTEMS; VITAL SIGNS; PHYSICAL EXAM; DISPOSITION.\par  \par \par \par \par  \par  \par

## 2021-10-19 NOTE — HISTORY OF PRESENT ILLNESS
[FreeTextEntry1] : Ms. Gaona is a 70 year old female. She is a former smoker (20 years, 1.5 PPD, Quit 1992) w/ hx of HTN, poliomyelitis, and partial thyroidectomy. Initially presented to office in November, 2020 for surgical evaluation of enlarging Left lung nodule. \par \par CT Chest on 09/04/2020 reveals: \par - Left apical 2.1 x 1.2 cm semisolid nodule, increased in size since prior study\par - Adjacent 6 mm semisolid nodule (4: 26), unchanged\par - RML 0.8 cm groundglass nodule (4: 55), unchanged\par - LLL 5 mm nodule (4: 77), also unchanged\par - Additional scattered bilateral groundglass nodules\par - 1.5 cm (3: 56) low-attenuation lesion in the pancreatic head \par \par PFTs on 10/23/2020 reveals:\par - FEV1: 2.38 (113%)\par - DLCO: 22.23 (109%)\par \par 12/3/2020: s/p Flexible bronch, uniportal Lt VATS, wedge rsx of LAINE x1, lingula sparing LULobectomy, MLND; Path of LAINE wedge resection revealing Invasive adenocarcinoma, acinar predominant with papillary subtype present; 2.4 cm; G2; All margins and all (0/23) lymph nodes negative; pT1c pN0 (Stage IA3). Path of LULobectomy: Negative for malignancy; multiple meningotheliod nodules. \par \par CT Chest on 4/1/21:\par - Post op changes\par - Enlarged RUL ground glass nodule; 1.1 x 0.9 (series 3, image 59); previously 1 x 0.7 cm. 0.6 cm\par - LLL subsolid nodule (series 3 image 56) is stable \par - Stable, mild subpleural nodularity within RLL (series 3 image 80)\par - Few other subpleural oval nodules representing benign intrapulmonary lymph nodes are also stable \par \par Now, s/p Flex bronch; uniportal Right VATS; RUL posterior segmentectomy; mediastinal and hilar lymph node dissection; intercostal nerve block on 4/22/21. Path revealing Adenocarcinoma, acinar predominant; G2; 1.1cm; All margins and LN (0/26) negative for tumor. pT1b pN0- Stage IA2. The presence of a lepidic pattern (AIS) indicates that this is a new primary adenocarcinoma of the lung.\par \par CT chest on 10/13/21:\par - Few stable lung nodules for instance 6 mm left lower lobe nodule on image 50 series 2. No new lung nodule. No pleural effusion.\par - Stable enlargement of the left thyroid lobe\par \par Patient presents to office for follow up. Patient denies worsening shortness of breath, cough, chest pain, fever, chills, unintentional weight loss, hemoptysis.

## 2021-11-16 ENCOUNTER — APPOINTMENT (OUTPATIENT)
Dept: ORTHOPEDIC SURGERY | Facility: CLINIC | Age: 71
End: 2021-11-16

## 2021-11-22 ENCOUNTER — APPOINTMENT (OUTPATIENT)
Dept: OBGYN | Facility: CLINIC | Age: 71
End: 2021-11-22
Payer: MEDICARE

## 2021-11-22 VITALS
DIASTOLIC BLOOD PRESSURE: 73 MMHG | HEART RATE: 112 BPM | BODY MASS INDEX: 37.74 KG/M2 | WEIGHT: 213 LBS | HEIGHT: 63 IN | SYSTOLIC BLOOD PRESSURE: 159 MMHG

## 2021-11-22 PROCEDURE — 99397 PER PM REEVAL EST PAT 65+ YR: CPT

## 2021-12-10 ENCOUNTER — OUTPATIENT (OUTPATIENT)
Dept: OUTPATIENT SERVICES | Facility: HOSPITAL | Age: 71
LOS: 1 days | End: 2021-12-10
Payer: MEDICARE

## 2021-12-10 ENCOUNTER — APPOINTMENT (OUTPATIENT)
Dept: RADIOLOGY | Facility: CLINIC | Age: 71
End: 2021-12-10
Payer: MEDICARE

## 2021-12-10 DIAGNOSIS — C34.91 MALIGNANT NEOPLASM OF UNSPECIFIED PART OF RIGHT BRONCHUS OR LUNG: ICD-10-CM

## 2021-12-10 DIAGNOSIS — Z98.890 OTHER SPECIFIED POSTPROCEDURAL STATES: Chronic | ICD-10-CM

## 2021-12-10 DIAGNOSIS — Z98.49 CATARACT EXTRACTION STATUS, UNSPECIFIED EYE: Chronic | ICD-10-CM

## 2021-12-10 DIAGNOSIS — C34.92 MALIGNANT NEOPLASM OF UNSPECIFIED PART OF LEFT BRONCHUS OR LUNG: ICD-10-CM

## 2021-12-10 DIAGNOSIS — Z96.659 PRESENCE OF UNSPECIFIED ARTIFICIAL KNEE JOINT: Chronic | ICD-10-CM

## 2021-12-10 PROCEDURE — 71046 X-RAY EXAM CHEST 2 VIEWS: CPT

## 2021-12-10 PROCEDURE — 71046 X-RAY EXAM CHEST 2 VIEWS: CPT | Mod: 26

## 2021-12-14 ENCOUNTER — APPOINTMENT (OUTPATIENT)
Dept: THORACIC SURGERY | Facility: CLINIC | Age: 71
End: 2021-12-14
Payer: MEDICARE

## 2021-12-14 VITALS
HEIGHT: 63 IN | HEART RATE: 100 BPM | OXYGEN SATURATION: 98 % | DIASTOLIC BLOOD PRESSURE: 77 MMHG | BODY MASS INDEX: 36.32 KG/M2 | RESPIRATION RATE: 18 BRPM | SYSTOLIC BLOOD PRESSURE: 153 MMHG | WEIGHT: 205 LBS

## 2021-12-14 PROCEDURE — 99214 OFFICE O/P EST MOD 30 MIN: CPT

## 2021-12-14 NOTE — CONSULT LETTER
[Dear  ___] : Dear  [unfilled], [Courtesy Letter:] : I had the pleasure of seeing your patient, [unfilled], in my office today. [Please see my note below.] : Please see my note below. [Sincerely,] : Sincerely, [FreeTextEntry2] : Dr. Arslan Larios (Pulm/Ref)  [FreeTextEntry3] : North Salmon MD, FACS \par Chief, Division of Thoracic Surgery \par Director, Minimally Invasive Thoracic Surgery \par Department of Cardiovascular and Thoracic Surgery \par [facility] \par , Cardiovascular and Thoracic Surgery\par \par \par

## 2021-12-14 NOTE — HISTORY OF PRESENT ILLNESS
[FreeTextEntry1] : Ms. Gaona is a 71 year old female. She is a former smoker (20 years, 1.5 PPD, Quit 1992) w/ hx of HTN, poliomyelitis, and partial thyroidectomy. Initially presented to office in November, 2020 for surgical evaluation of enlarging Left lung nodule. \par \par CT Chest on 09/04/2020 reveals: \par - Left apical 2.1 x 1.2 cm semisolid nodule, increased in size since prior study\par - Adjacent 6 mm semisolid nodule (4: 26), unchanged\par - RML 0.8 cm groundglass nodule (4: 55), unchanged\par - LLL 5 mm nodule (4: 77), also unchanged\par - Additional scattered bilateral groundglass nodules\par - 1.5 cm (3: 56) low-attenuation lesion in the pancreatic head \par \par PFTs on 10/23/2020 reveals:\par - FEV1: 2.38 (113%)\par - DLCO: 22.23 (109%)\par \par 12/3/2020: s/p Flexible bronch, uniportal Lt VATS, wedge rsx of LAINE x1, lingula sparing LULobectomy, MLND; Path of LAINE wedge resection revealing Invasive adenocarcinoma, acinar predominant with papillary subtype present; 2.4 cm; G2; All margins and all (0/23) lymph nodes negative; pT1c pN0 (Stage IA3). Path of LULobectomy: Negative for malignancy; multiple meningotheliod nodules. \par \par CT Chest on 4/1/21:\par - Post op changes\par - Enlarged RUL ground glass nodule; 1.1 x 0.9 (series 3, image 59); previously 1 x 0.7 cm. 0.6 cm\par - LLL subsolid nodule (series 3 image 56) is stable \par - Stable, mild subpleural nodularity within RLL (series 3 image 80)\par - Few other subpleural oval nodules representing benign intrapulmonary lymph nodes are also stable \par \par Now, s/p Flex bronch; uniportal Right VATS; RUL posterior segmentectomy; mediastinal and hilar lymph node dissection; intercostal nerve block on 4/22/21. Path revealing Adenocarcinoma, acinar predominant; G2; 1.1cm; All margins and LN (0/26) negative for tumor. pT1b pN0- Stage IA2. The presence of a lepidic pattern (AIS) indicates that this is a new primary adenocarcinoma of the lung.\par \par CT chest on 10/13/21:\par - Few stable lung nodules for instance 6 mm left lower lobe nodule on image 50 series 2. No new lung nodule. No pleural effusion.\par - Stable enlargement of the left thyroid lobe\par \par CXR on 12/10/2021:\par - Status post posterior RIGHT upper lobe segmentectomy.\par - No acute radiographic cardiopulmonary pathology.\par \par Patient presents to office for follow up. Patient denies shortness of breath, cough, chest pain, fever, chills. \par

## 2021-12-14 NOTE — ASSESSMENT
[FreeTextEntry1] : Ms. Gaona is a 71 year old female. She is a former smoker (20 years, 1.5 PPD, Quit 1992) w/ hx of HTN, poliomyelitis, and partial thyroidectomy. Initially presented to office in November, 2020 for surgical evaluation of enlarging Left lung nodule. \par \par 12/3/2020: s/p Flexible bronch, uniportal Lt VATS, wedge rsx of LAINE x1, lingula sparing LULobectomy, MLND; Path of LAINE wedge resection revealing Invasive adenocarcinoma, acinar predominant with papillary subtype present; 2.4 cm; G2; All margins and all (0/23) lymph nodes negative; pT1c pN0 (Stage IA3). Path of LULobectomy: Negative for malignancy; multiple meningotheliod nodules. \par \par Now, s/p Flex bronch; uniportal Right VATS; RUL posterior segmentectomy; mediastinal and hilar lymph node dissection; intercostal nerve block on 4/22/21. Path revealing Adenocarcinoma, acinar predominant; G2; 1.1cm; All margins and LN (0/26) negative for tumor. pT1b pN0- Stage IA2. The presence of a lepidic pattern (AIS) indicates that this is a new primary adenocarcinoma of the lung.\par \par CXR on 12/10/2021:\par - Status post posterior RIGHT upper lobe segmentectomy.\par - No acute radiographic cardiopulmonary pathology.\par \par I have reviewed the patient's medical records and diagnostic images at time of this office consultation and have made the following recommendation:\par 1. CXR reviewed and explained to patient, I recommended patient to return to office in 3 months with CT Chest without contrast. \par \par I personally performed the services described in the documentation, reviewed the documentation recorded by the scribe in my presence and it accurately and completely records my words and actions.\par \par I, Amanda Pierre NP, am scribing for and the presence of ALDEN Cast, the following sections HISTORY OF PRESENT ILLNESS, PAST MEDICAL/FAMILY/SOCIAL HISTORY; REVIEW OF SYSTEMS; VITAL SIGNS; PHYSICAL EXAM; DISPOSITION.

## 2021-12-21 NOTE — DISCHARGE NOTE PROVIDER - NSRESEARCHGRANT_PROPHYLAXISRECOMFT_GEN_A_CORE
Nursing Admission note     12/21/21 1000   RN Monitoring of Pain, Safety, and Relapse   Safety Concerns None   Physical Concerns no   Pain Concerns no   Use of Street Drugs or Alcohol Yes   Taking Medications as Prescribed Yes   Patient Response Interactive   Writer met with patient today for nursing admission assessment.  He presented with a constricted affect, feeling angry/upset, anxious, irritable, and having negative thoughts.  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  Writer alerted the treatment team.  Patient reports that he has sleep problems despite taking trazodone (which he says is making him \"congested and loopy\").  It takes 3-4 hours to fall asleep  Patient denies appetite problems, AVH, and pain.  He reports that a few weeks ago he fell off of his skateboard and injured his right knee which is still bothering him.  He says that his parents are aware, but he has not had it assessed by a provider.  Patient denies SI, SH urges, or urges to harm others.  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  Patient states that he smokes marijuana but \"not that often\".  Patient is compliant with medication.  Vitals: P 69, /78, Pox 99%, temp 98.8.  Writer will continue to monitor.   No post-discharge thromboprophylaxis indicated. This is a surgical and/or non-medical patient.

## 2022-04-12 ENCOUNTER — OUTPATIENT (OUTPATIENT)
Dept: OUTPATIENT SERVICES | Facility: HOSPITAL | Age: 72
LOS: 1 days | End: 2022-04-12
Payer: MEDICARE

## 2022-04-12 ENCOUNTER — APPOINTMENT (OUTPATIENT)
Dept: CT IMAGING | Facility: IMAGING CENTER | Age: 72
End: 2022-04-12
Payer: MEDICARE

## 2022-04-12 DIAGNOSIS — Z96.659 PRESENCE OF UNSPECIFIED ARTIFICIAL KNEE JOINT: Chronic | ICD-10-CM

## 2022-04-12 DIAGNOSIS — Z98.890 OTHER SPECIFIED POSTPROCEDURAL STATES: Chronic | ICD-10-CM

## 2022-04-12 DIAGNOSIS — Z98.49 CATARACT EXTRACTION STATUS, UNSPECIFIED EYE: Chronic | ICD-10-CM

## 2022-04-12 DIAGNOSIS — C34.91 MALIGNANT NEOPLASM OF UNSPECIFIED PART OF RIGHT BRONCHUS OR LUNG: ICD-10-CM

## 2022-04-12 DIAGNOSIS — C34.92 MALIGNANT NEOPLASM OF UNSPECIFIED PART OF LEFT BRONCHUS OR LUNG: ICD-10-CM

## 2022-04-12 PROCEDURE — G1004: CPT

## 2022-04-12 PROCEDURE — 71250 CT THORAX DX C-: CPT | Mod: MG

## 2022-04-12 PROCEDURE — 71250 CT THORAX DX C-: CPT | Mod: 26,MG

## 2022-04-18 PROBLEM — R91.1 LUNG NODULE: Status: ACTIVE | Noted: 2020-11-03

## 2022-04-19 ENCOUNTER — APPOINTMENT (OUTPATIENT)
Dept: THORACIC SURGERY | Facility: CLINIC | Age: 72
End: 2022-04-19
Payer: MEDICARE

## 2022-04-19 VITALS
HEIGHT: 63 IN | WEIGHT: 205 LBS | SYSTOLIC BLOOD PRESSURE: 136 MMHG | HEART RATE: 80 BPM | DIASTOLIC BLOOD PRESSURE: 77 MMHG | OXYGEN SATURATION: 98 % | BODY MASS INDEX: 36.32 KG/M2

## 2022-04-19 DIAGNOSIS — R91.1 SOLITARY PULMONARY NODULE: ICD-10-CM

## 2022-04-19 PROCEDURE — 99214 OFFICE O/P EST MOD 30 MIN: CPT

## 2022-04-19 NOTE — PHYSICAL EXAM
[] : no respiratory distress [Respiration, Rhythm And Depth] : normal respiratory rhythm and effort [Exaggerated Use Of Accessory Muscles For Inspiration] : no accessory muscle use [Auscultation Breath Sounds / Voice Sounds] : lungs were clear to auscultation bilaterally [Heart Rate And Rhythm] : heart rate was normal and rhythm regular [Examination Of The Chest] : the chest was normal in appearance [Chest Visual Inspection Thoracic Asymmetry] : no chest asymmetry [Diminished Respiratory Excursion] : normal chest expansion [2+] : left 2+ [Bowel Sounds] : normal bowel sounds [Abdomen Soft] : soft [Abdomen Tenderness] : non-tender [Cervical Lymph Nodes Enlarged Posterior Bilaterally] : posterior cervical [Cervical Lymph Nodes Enlarged Anterior Bilaterally] : anterior cervical [Supraclavicular Lymph Nodes Enlarged Bilaterally] : supraclavicular [Involuntary Movements] : no involuntary movements were seen [Skin Color & Pigmentation] : normal skin color and pigmentation [Oriented To Time, Place, And Person] : oriented to person, place, and time

## 2022-04-22 NOTE — HISTORY OF PRESENT ILLNESS
[FreeTextEntry1] : Ms. Gaona is a 71 year old female. She is a former smoker (20 years, 1.5 PPD, Quit 1992) w/ hx of HTN, poliomyelitis, and partial thyroidectomy. Initially presented to office in November, 2020 for surgical evaluation of enlarging Left lung nodule. \par \par CT Chest on 09/04/2020 reveals: \par - Left apical 2.1 x 1.2 cm semisolid nodule, increased in size since prior study\par - Adjacent 6 mm semisolid nodule (4: 26), unchanged\par - RML 0.8 cm groundglass nodule (4: 55), unchanged\par - LLL 5 mm nodule (4: 77), also unchanged\par - Additional scattered bilateral groundglass nodules\par - 1.5 cm (3: 56) low-attenuation lesion in the pancreatic head \par \par PFTs on 10/23/2020 reveals:\par - FEV1: 2.38 (113%)\par - DLCO: 22.23 (109%)\par \par 12/3/2020: s/p Flexible bronch, uniportal Lt VATS, wedge rsx of LAINE x1, lingula sparing LULobectomy, MLND; Path of LAINE wedge resection revealing Invasive adenocarcinoma, acinar predominant with papillary subtype present; 2.4 cm; G2; All margins and all (0/23) lymph nodes negative; pT1c pN0 (Stage IA3). Path of LULobectomy: Negative for malignancy; multiple meningotheliod nodules. \par \par CT Chest on 4/1/21:\par - Post op changes\par - Enlarged RUL ground glass nodule; 1.1 x 0.9 (series 3, image 59); previously 1 x 0.7 cm. 0.6 cm\par - LLL subsolid nodule (series 3 image 56) is stable \par - Stable, mild subpleural nodularity within RLL (series 3 image 80)\par - Few other subpleural oval nodules representing benign intrapulmonary lymph nodes are also stable \par \par Now, s/p Flex bronch; uniportal Right VATS; RUL posterior segmentectomy; mediastinal and hilar lymph node dissection; intercostal nerve block on 4/22/21. Path revealing Adenocarcinoma, acinar predominant; G2; 1.1cm; All margins and LN (0/26) negative for tumor. pT1b pN0- Stage IA2. The presence of a lepidic pattern (AIS) indicates that this is a new primary adenocarcinoma of the lung.\par \par CT chest on 10/13/21:\par - Few stable lung nodules for instance 6 mm left lower lobe nodule on image 50 series 2. No new lung nodule. No pleural effusion.\par - Stable enlargement of the left thyroid lobe\par \par CXR on 12/10/2021:\par - Status post posterior RIGHT upper lobe segmentectomy.\par - No acute radiographic cardiopulmonary pathology.\par \par CT Chest on 4/12/22: \par - Postoperative changes \par - Few stable lung nodules for instance 6 mm LLL (2, 49) since 9/2020. No new lung nodule\par \par Patient presents to office for follow up. Today, patient denies worsening SOB, chest pain, cough, hemoptysis, fever, chills, night sweats, lightheadedness or dizziness.\par \par

## 2022-04-22 NOTE — CONSULT LETTER
[Dear  ___] : Dear  [unfilled], [Courtesy Letter:] : I had the pleasure of seeing your patient, [unfilled], in my office today. [Please see my note below.] : Please see my note below. [Sincerely,] : Sincerely, [FreeTextEntry2] : Dr. Arslan Larios (Pulm/Ref)  [FreeTextEntry3] : Nroth Salmon MD, FACS \par Chief, Division of Thoracic Surgery \par Director, Minimally Invasive Thoracic Surgery \par Department of Cardiovascular and Thoracic Surgery \par [facility] \par , Cardiovascular and Thoracic Surgery\par \par \par

## 2022-04-22 NOTE — ASSESSMENT
[FreeTextEntry1] : Ms. Gaona is a 71 year old female. She is a former smoker (20 years, 1.5 PPD, Quit 1992) w/ hx of HTN, poliomyelitis, and partial thyroidectomy. Initially presented to office in November, 2020 for surgical evaluation of enlarging Left lung nodule. \par \par 12/3/2020: s/p Flexible bronch, uniportal Lt VATS, wedge rsx of LAINE x1, lingula sparing LULobectomy, MLND; Path of LAINE wedge resection revealing Invasive adenocarcinoma, acinar predominant with papillary subtype present; 2.4 cm; G2; All margins and all (0/23) lymph nodes negative; pT1c pN0 (Stage IA3). Path of LULobectomy: Negative for malignancy; multiple meningotheliod nodules. \par \par Now, s/p Flex bronch; uniportal Right VATS; RUL posterior segmentectomy; mediastinal and hilar lymph node dissection; intercostal nerve block on 4/22/21. Path revealing Adenocarcinoma, acinar predominant; G2; 1.1cm; All margins and LN (0/26) negative for tumor. pT1b pN0- Stage IA2. The presence of a lepidic pattern (AIS) indicates that this is a new primary adenocarcinoma of the lung.\par \par I have independently reviewed the medical records and imaging at the time of this office consultation, and discussed the following interpretations and recommendations with the patient:\par - CT Chest with stable findings; Recommendation to return to clinic in 6 months with repeat CT Chest, no contrast, to re-evaluate stability. \par \par Recommendations reviewed with patient during this office visit, and all questions answered; Patient instructed on the importance of follow up and verbalizes understanding.\par \par I personally performed the services described in the documentation, reviewed the documentation recorded by the scribe in my presence and it accurately and completely records my words and actions.\par \par I, RAUL Thakur-C, am scribing for and the presence of ALDEN Cast, the following sections HISTORY OF PRESENT ILLNESS, PAST MEDICAL/FAMILY/SOCIAL HISTORY; REVIEW OF SYSTEMS; VITAL SIGNS; PHYSICAL EXAM; DISPOSITION.\par \par \par

## 2022-05-16 NOTE — ASU DISCHARGE PLAN (ADULT/PEDIATRIC). - IF YOU ARE HAVING DISCOMFORT AND/OR PAIN PLEASE CONTACT YOUR PHYSICIAN
Statement Selected Acitretin Pregnancy And Lactation Text: This medication is Pregnancy Category X and should not be given to women who are pregnant or may become pregnant in the future. This medication is excreted in breast milk.

## 2022-07-11 ENCOUNTER — APPOINTMENT (OUTPATIENT)
Dept: RADIOLOGY | Facility: CLINIC | Age: 72
End: 2022-07-11

## 2022-07-11 ENCOUNTER — APPOINTMENT (OUTPATIENT)
Dept: MAMMOGRAPHY | Facility: IMAGING CENTER | Age: 72
End: 2022-07-11

## 2022-07-11 ENCOUNTER — OUTPATIENT (OUTPATIENT)
Dept: OUTPATIENT SERVICES | Facility: HOSPITAL | Age: 72
LOS: 1 days | End: 2022-07-11
Payer: MEDICARE

## 2022-07-11 ENCOUNTER — APPOINTMENT (OUTPATIENT)
Dept: CT IMAGING | Facility: CLINIC | Age: 72
End: 2022-07-11

## 2022-07-11 ENCOUNTER — RESULT REVIEW (OUTPATIENT)
Age: 72
End: 2022-07-11

## 2022-07-11 ENCOUNTER — APPOINTMENT (OUTPATIENT)
Dept: ULTRASOUND IMAGING | Facility: IMAGING CENTER | Age: 72
End: 2022-07-11

## 2022-07-11 ENCOUNTER — OUTPATIENT (OUTPATIENT)
Dept: OUTPATIENT SERVICES | Facility: HOSPITAL | Age: 72
LOS: 1 days | End: 2022-07-11

## 2022-07-11 ENCOUNTER — APPOINTMENT (OUTPATIENT)
Dept: RADIOLOGY | Facility: IMAGING CENTER | Age: 72
End: 2022-07-11

## 2022-07-11 DIAGNOSIS — Z98.890 OTHER SPECIFIED POSTPROCEDURAL STATES: Chronic | ICD-10-CM

## 2022-07-11 DIAGNOSIS — Z01.419 ENCOUNTER FOR GYNECOLOGICAL EXAMINATION (GENERAL) (ROUTINE) WITHOUT ABNORMAL FINDINGS: ICD-10-CM

## 2022-07-11 DIAGNOSIS — Z96.659 PRESENCE OF UNSPECIFIED ARTIFICIAL KNEE JOINT: Chronic | ICD-10-CM

## 2022-07-11 DIAGNOSIS — Z98.49 CATARACT EXTRACTION STATUS, UNSPECIFIED EYE: Chronic | ICD-10-CM

## 2022-07-11 DIAGNOSIS — C34.92 MALIGNANT NEOPLASM OF UNSPECIFIED PART OF LEFT BRONCHUS OR LUNG: ICD-10-CM

## 2022-07-11 DIAGNOSIS — M85.80 OTHER SPECIFIED DISORDERS OF BONE DENSITY AND STRUCTURE, UNSPECIFIED SITE: ICD-10-CM

## 2022-07-11 DIAGNOSIS — C34.91 MALIGNANT NEOPLASM OF UNSPECIFIED PART OF RIGHT BRONCHUS OR LUNG: ICD-10-CM

## 2022-07-11 PROCEDURE — 76641 ULTRASOUND BREAST COMPLETE: CPT | Mod: 26,50

## 2022-07-11 PROCEDURE — 71046 X-RAY EXAM CHEST 2 VIEWS: CPT

## 2022-07-11 PROCEDURE — 77085 DXA BONE DENSITY AXL VRT FX: CPT | Mod: 26

## 2022-07-11 PROCEDURE — 77067 SCR MAMMO BI INCL CAD: CPT

## 2022-07-11 PROCEDURE — 77063 BREAST TOMOSYNTHESIS BI: CPT

## 2022-07-11 PROCEDURE — 77085 DXA BONE DENSITY AXL VRT FX: CPT

## 2022-07-11 PROCEDURE — 71046 X-RAY EXAM CHEST 2 VIEWS: CPT | Mod: 26

## 2022-07-11 PROCEDURE — 76641 ULTRASOUND BREAST COMPLETE: CPT

## 2022-07-11 PROCEDURE — 77063 BREAST TOMOSYNTHESIS BI: CPT | Mod: 26

## 2022-07-11 PROCEDURE — 77067 SCR MAMMO BI INCL CAD: CPT | Mod: 26

## 2022-07-19 ENCOUNTER — APPOINTMENT (OUTPATIENT)
Dept: THORACIC SURGERY | Facility: CLINIC | Age: 72
End: 2022-07-19

## 2022-07-19 VITALS
DIASTOLIC BLOOD PRESSURE: 79 MMHG | OXYGEN SATURATION: 97 % | WEIGHT: 205 LBS | RESPIRATION RATE: 16 BRPM | HEART RATE: 90 BPM | BODY MASS INDEX: 36.32 KG/M2 | HEIGHT: 63 IN | SYSTOLIC BLOOD PRESSURE: 143 MMHG

## 2022-07-19 PROBLEM — H40.003 GLAUCOMA SUSPECT OF BOTH EYES: Status: ACTIVE | Noted: 2017-10-24

## 2022-07-19 PROCEDURE — 99214 OFFICE O/P EST MOD 30 MIN: CPT

## 2022-07-19 NOTE — ASSESSMENT
[FreeTextEntry1] : Ms. Gaona is a 71 year old female. She is a former smoker (20 years, 1.5 PPD, Quit 1992) w/ hx of HTN, poliomyelitis, and partial thyroidectomy. Initially presented to office in November, 2020 for surgical evaluation of enlarging Left lung nodule. \par \par 12/3/2020: s/p Flexible bronch, uniportal Lt VATS, wedge rsx of LAINE x1, lingula sparing LULobectomy, MLND; Path of LAINE wedge resection revealing Invasive adenocarcinoma, acinar predominant with papillary subtype present; 2.4 cm; G2; All margins and all (0/23) lymph nodes negative; pT1c pN0 (Stage IA3). Path of LULobectomy: Negative for malignancy; multiple meningotheliod nodules. \par \par Now, s/p Flex bronch; uniportal Right VATS; RUL posterior segmentectomy; mediastinal and hilar lymph node dissection; intercostal nerve block on 4/22/21. Path revealing Adenocarcinoma, acinar predominant; G2; 1.1cm; All margins and LN (0/26) negative for tumor. pT1b pN0- Stage IA2. The presence of a lepidic pattern (AIS) indicates that this is a new primary adenocarcinoma of the lung.\par \par I have reviewed the patient's medical records and diagnostic images at time of this office consultation and have made the following recommendation:\par 1. CXR showed no evidence of recurrence, recommended patient to return to office in 3mo w/ CT Chest w/o contrast.\par \par \par I personally performed the services described in the documentation, reviewed the documentation recorded by the scribe in my presence and it accurately and completely records my words and actions.\par \par I, Zion Arriola NP, am scribing for and the presence of ALDEN Cast, the following sections HISTORY OF PRESENT ILLNESS, PAST MEDICAL/FAMILY/SOCIAL HISTORY; REVIEW OF SYSTEMS; VITAL SIGNS; PHYSICAL EXAM; DISPOSITION.\par \par \par \par \par

## 2022-07-19 NOTE — HISTORY OF PRESENT ILLNESS
[FreeTextEntry1] : Ms. Gaona is a 71 year old female. She is a former smoker (20 years, 1.5 PPD, Quit 1992) w/ hx of HTN, poliomyelitis, and partial thyroidectomy. Initially presented to office in November, 2020 for surgical evaluation of enlarging Left lung nodule. \par \par CT Chest on 09/04/2020 reveals: \par - Left apical 2.1 x 1.2 cm semisolid nodule, increased in size since prior study\par - Adjacent 6 mm semisolid nodule (4: 26), unchanged\par - RML 0.8 cm groundglass nodule (4: 55), unchanged\par - LLL 5 mm nodule (4: 77), also unchanged\par - Additional scattered bilateral groundglass nodules\par - 1.5 cm (3: 56) low-attenuation lesion in the pancreatic head \par \par PFTs on 10/23/2020 reveals:\par - FEV1: 2.38 (113%)\par - DLCO: 22.23 (109%)\par \par 12/3/2020: s/p Flexible bronch, uniportal Lt VATS, wedge rsx of LAINE x1, lingula sparing LULobectomy, MLND; Path of LAINE wedge resection revealing Invasive adenocarcinoma, acinar predominant with papillary subtype present; 2.4 cm; G2; All margins and all (0/23) lymph nodes negative; pT1c pN0 (Stage IA3). Path of LULobectomy: Negative for malignancy; multiple meningotheliod nodules. \par \par CT Chest on 4/1/21:\par - Post op changes\par - Enlarged RUL ground glass nodule; 1.1 x 0.9 (series 3, image 59); previously 1 x 0.7 cm. 0.6 cm\par - LLL subsolid nodule (series 3 image 56) is stable \par - Stable, mild subpleural nodularity within RLL (series 3 image 80)\par - Few other subpleural oval nodules representing benign intrapulmonary lymph nodes are also stable \par \par Now, s/p Flex bronch; uniportal Right VATS; RUL posterior segmentectomy; mediastinal and hilar lymph node dissection; intercostal nerve block on 4/22/21. Path revealing Adenocarcinoma, acinar predominant; G2; 1.1cm; All margins and LN (0/26) negative for tumor. pT1b pN0- Stage IA2. The presence of a lepidic pattern (AIS) indicates that this is a new primary adenocarcinoma of the lung.\par \par CT chest on 10/13/21:\par - Few stable lung nodules for instance 6 mm left lower lobe nodule on image 50 series 2. No new lung nodule. No pleural effusion.\par - Stable enlargement of the left thyroid lobe\par \par CXR on 12/10/2021:\par - Status post posterior RIGHT upper lobe segmentectomy.\par - No acute radiographic cardiopulmonary pathology.\par \par CT Chest on 4/12/22: \par - Postoperative changes \par - Few stable lung nodules for instance 6 mm LLL (2, 49) since 9/2020. No new lung nodule\par \par CXR on 7/11/22: chronic Rt upper zone linear fibrosis/atelectasis adjacent to the surgical suture line\par \par Patient is here today for a follow up. Denies SOB, CP, cough.\par \par \par

## 2022-07-19 NOTE — CONSULT LETTER
[Dear  ___] : Dear  [unfilled], [Courtesy Letter:] : I had the pleasure of seeing your patient, [unfilled], in my office today. [Please see my note below.] : Please see my note below. [Sincerely,] : Sincerely, [FreeTextEntry2] : Dr. Arslan Larios (Pulm/Ref)  [FreeTextEntry3] : North Salmon MD, FACS \par Chief, Division of Thoracic Surgery \par Director, Minimally Invasive Thoracic Surgery \par Department of Cardiovascular and Thoracic Surgery \par [facility] \par , Cardiovascular and Thoracic Surgery\par \par \par

## 2022-07-26 ENCOUNTER — APPOINTMENT (OUTPATIENT)
Dept: ORTHOPEDIC SURGERY | Facility: CLINIC | Age: 72
End: 2022-07-26

## 2022-07-26 VITALS — SYSTOLIC BLOOD PRESSURE: 142 MMHG | DIASTOLIC BLOOD PRESSURE: 77 MMHG | HEART RATE: 87 BPM | HEIGHT: 63 IN

## 2022-07-26 DIAGNOSIS — M11.261 OTHER CHONDROCALCINOSIS, RIGHT KNEE: ICD-10-CM

## 2022-07-26 PROCEDURE — 73564 X-RAY EXAM KNEE 4 OR MORE: CPT | Mod: 50

## 2022-07-26 PROCEDURE — 99213 OFFICE O/P EST LOW 20 MIN: CPT

## 2022-07-26 NOTE — DISCUSSION/SUMMARY
[de-identified] : Therapeutic decision making\par \par Patient continues continues to do extremely well with her left knee.  The cortisone shot given a year ago gave her relief and she is still managing and would like to stay on conservative measures.  She will return in 1 year for follow-up or sooner if necessary.  \par

## 2022-07-26 NOTE — PHYSICAL EXAM
[FreeTextEntry2] : This patient has a left total knee replacement.  She has no pain with it it is good strength and is managing her ambulation because her right knee has polio and she has no active extension.  She has good medial lateral and anterior posterior stability and the patella tracks well.\par \par Right knee has some pain but she has no active extension she wears an orthotic for a foot drop and has marked limitation in strength this leg.  At this time she is managing with this discomfort.  She would like to stay on conservative measures and I am in agreement because they have prosthetic replacement of this knee would be a problem even to consider because of the motor absence.  \par \par  [de-identified] : 4 views were taken of the patient's left knee and knee show excellent position of fixation of the [employer] cemented total knee replacement there is no evidence of osteolysis or loosening and the patella tracks well.  \par \par Of 4 views were also taken of the right knee.  The polio has caused the bone to be thinner and somewhat more atrophic on this side.  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  She appears to have some bilateral chondrocalcinosis.  She may have had a healed fibula fracture in the past.  Impression this patient has a polio knee with degenerative arthritis.  \par \par

## 2022-07-26 NOTE — HISTORY OF PRESENT ILLNESS
[de-identified] : 70 y/o female s/p L TKR 10/8/12 doing well.\par No pain or complaints about the left knee.\par She is managing well with her day-to-day activities. \par Her right knee has been doing very well having received cortisone injection on 7/13/21, which has continued to help very well to manage the right knee pain.

## 2022-10-11 ENCOUNTER — APPOINTMENT (OUTPATIENT)
Dept: CT IMAGING | Facility: IMAGING CENTER | Age: 72
End: 2022-10-11

## 2022-10-11 ENCOUNTER — OUTPATIENT (OUTPATIENT)
Dept: OUTPATIENT SERVICES | Facility: HOSPITAL | Age: 72
LOS: 1 days | End: 2022-10-11
Payer: MEDICARE

## 2022-10-11 DIAGNOSIS — Z98.49 CATARACT EXTRACTION STATUS, UNSPECIFIED EYE: Chronic | ICD-10-CM

## 2022-10-11 DIAGNOSIS — C34.92 MALIGNANT NEOPLASM OF UNSPECIFIED PART OF LEFT BRONCHUS OR LUNG: ICD-10-CM

## 2022-10-11 DIAGNOSIS — Z98.890 OTHER SPECIFIED POSTPROCEDURAL STATES: Chronic | ICD-10-CM

## 2022-10-11 DIAGNOSIS — Z96.659 PRESENCE OF UNSPECIFIED ARTIFICIAL KNEE JOINT: Chronic | ICD-10-CM

## 2022-10-11 PROCEDURE — 71250 CT THORAX DX C-: CPT

## 2022-10-11 PROCEDURE — 71250 CT THORAX DX C-: CPT | Mod: 26,MH

## 2022-10-18 ENCOUNTER — APPOINTMENT (OUTPATIENT)
Dept: THORACIC SURGERY | Facility: CLINIC | Age: 72
End: 2022-10-18

## 2022-10-18 DIAGNOSIS — Z86.79 PERSONAL HISTORY OF OTHER DISEASES OF THE CIRCULATORY SYSTEM: ICD-10-CM

## 2022-10-18 PROCEDURE — 99443: CPT | Mod: 95

## 2022-10-18 NOTE — HISTORY OF PRESENT ILLNESS
[Home] : at home, [unfilled] , at the time of the visit. [Medical Office: ([facility])___] : at the medical office located in  [Verbal consent obtained from patient] : the patient, [unfilled] [FreeTextEntry1] : \par Ms. Gaona is a 71 year old female. She is a former smoker (20 years, 1.5 PPD, Quit 1992) w/ hx of HTN, poliomyelitis, and partial thyroidectomy. Initially presented to office in November, 2020 for surgical evaluation of enlarging Left lung nodule. \par \par CT Chest on 09/04/2020 reveals: \par - Left apical 2.1 x 1.2 cm semisolid nodule, increased in size since prior study\par - Adjacent 6 mm semisolid nodule (4: 26), unchanged\par - RML 0.8 cm groundglass nodule (4: 55), unchanged\par - LLL 5 mm nodule (4: 77), also unchanged\par - Additional scattered bilateral groundglass nodules\par - 1.5 cm (3: 56) low-attenuation lesion in the pancreatic head \par \par PFTs on 10/23/2020 reveals:\par - FEV1: 2.38 (113%)\par - DLCO: 22.23 (109%)\par \par 12/3/2020: s/p Flexible bronch, uniportal Lt VATS, wedge rsx of LAINE x1, lingula sparing LULobectomy, MLND; Path of LAINE wedge resection revealing Invasive adenocarcinoma, acinar predominant with papillary subtype present; 2.4 cm; G2; All margins and all (0/23) lymph nodes negative; pT1c pN0 (Stage IA3). Path of LULobectomy: Negative for malignancy; multiple meningotheliod nodules. \par \par CT Chest on 4/1/21:\par - Post op changes\par - Enlarged RUL ground glass nodule; 1.1 x 0.9 (series 3, image 59); previously 1 x 0.7 cm. 0.6 cm\par - LLL subsolid nodule (series 3 image 56) is stable \par - Stable, mild subpleural nodularity within RLL (series 3 image 80)\par - Few other subpleural oval nodules representing benign intrapulmonary lymph nodes are also stable \par \par Now, s/p Flex bronch; uniportal Right VATS; RUL posterior segmentectomy; mediastinal and hilar lymph node dissection; intercostal nerve block on 4/22/21. Path revealing Adenocarcinoma, acinar predominant; G2; 1.1cm; All margins and LN (0/26) negative for tumor. pT1b pN0- Stage IA2. The presence of a lepidic pattern (AIS) indicates that this is a new primary adenocarcinoma of the lung.\par \par CT chest on 10/13/21:\par - Few stable lung nodules for instance 6 mm left lower lobe nodule on image 50 series 2. No new lung nodule. No pleural effusion.\par - Stable enlargement of the left thyroid lobe\par \par CXR on 12/10/2021:\par - Status post posterior RIGHT upper lobe segmentectomy.\par - No acute radiographic cardiopulmonary pathology.\par \par CT Chest on 4/12/22: \par - Postoperative changes \par - Few stable lung nodules for instance 6 mm LLL (2, 49) since 9/2020. No new lung nodule\par \par CXR on 7/11/22: chronic Rt upper zone linear fibrosis/atelectasis adjacent to the surgical suture line\par \par CT Chest on 10/11/22:\par - post-op changes\par - Few stable lung nodules:  6 mm LLL nodule (2:49) since 9/2020. \par - No new lung nodule. No pleural effusion.\par - No thoracic adenopathy.\par - Prior right hemithyroidectomy. \par \par Patient is followed today via Telephonic visit. Patient denies shortness of breath, cough, chest pain, fever, chills.

## 2022-10-18 NOTE — ASSESSMENT
[FreeTextEntry1] : Ms. Gaona is a 71 year old female. She is a former smoker (20 years, 1.5 PPD, Quit 1992) w/ hx of HTN, poliomyelitis, and partial thyroidectomy. Initially presented to office in November, 2020 for surgical evaluation of enlarging Left lung nodule. \par \par 12/3/2020: s/p Flexible bronch, uniportal Lt VATS, wedge rsx of LAINE x1, lingula sparing LULobectomy, MLND; Path of LAINE wedge resection revealing Invasive adenocarcinoma, acinar predominant with papillary subtype present; 2.4 cm; G2; All margins and all (0/23) lymph nodes negative; pT1c pN0 (Stage IA3). Path of LULobectomy: Negative for malignancy; multiple meningotheliod nodules. \par \par Now, s/p Flex bronch; uniportal Right VATS; RUL posterior segmentectomy; mediastinal and hilar lymph node dissection; intercostal nerve block on 4/22/21. Path revealing Adenocarcinoma, acinar predominant; G2; 1.1cm; All margins and LN (0/26) negative for tumor. pT1b pN0- Stage IA2. The presence of a lepidic pattern (AIS) indicates that this is a new primary adenocarcinoma of the lung.\par \par I have reviewed the patient's medical records and diagnostic images at time of this office consultation and have made the following recommendation:\par 1. CT chest reviewed and explained to patient, I recommended patient to return to office in 3 months with CXR. \par \par I personally performed the services described in the documentation, reviewed the documentation recorded by the scribe in my presence and it accurately and completely records my words and actions.\par \par I, Amanda Pierre, NP, am scribing for and the presence of ALDEN Cast, the following sections HISTORY OF PRESENT ILLNESS, PAST MEDICAL/FAMILY/SOCIAL HISTORY; REVIEW OF SYSTEMS; VITAL SIGNS; PHYSICAL EXAM; DISPOSITION.

## 2022-10-18 NOTE — CONSULT LETTER
[Dear  ___] : Dear  [unfilled], [Courtesy Letter:] : I had the pleasure of seeing your patient, [unfilled], in my office today. [Please see my note below.] : Please see my note below. [Sincerely,] : Sincerely, [FreeTextEntry2] : Dr. Arslan Larios (Pulm/Ref)  [FreeTextEntry3] : North Salmon MD, FACS \par Chief, Division of Thoracic Surgery \par Director, Minimally Invasive Thoracic Surgery \par Department of Cardiovascular and Thoracic Surgery \par [facility] \par , Cardiovascular and Thoracic Surgery\par \par \par

## 2022-12-02 ENCOUNTER — APPOINTMENT (OUTPATIENT)
Dept: OBGYN | Facility: CLINIC | Age: 72
End: 2022-12-02
Payer: MEDICARE

## 2022-12-02 VITALS
DIASTOLIC BLOOD PRESSURE: 82 MMHG | HEART RATE: 84 BPM | WEIGHT: 196 LBS | SYSTOLIC BLOOD PRESSURE: 167 MMHG | BODY MASS INDEX: 34.73 KG/M2 | HEIGHT: 63 IN

## 2022-12-02 DIAGNOSIS — L53.9 ERYTHEMATOUS CONDITION, UNSPECIFIED: ICD-10-CM

## 2022-12-02 DIAGNOSIS — Z01.419 ENCOUNTER FOR GYNECOLOGICAL EXAMINATION (GENERAL) (ROUTINE) W/OUT ABNORMAL FINDINGS: ICD-10-CM

## 2022-12-02 PROCEDURE — G0101: CPT

## 2022-12-06 ENCOUNTER — OUTPATIENT (OUTPATIENT)
Dept: OUTPATIENT SERVICES | Facility: HOSPITAL | Age: 72
LOS: 1 days | End: 2022-12-06
Payer: MEDICARE

## 2022-12-06 ENCOUNTER — APPOINTMENT (OUTPATIENT)
Dept: RADIOLOGY | Facility: IMAGING CENTER | Age: 72
End: 2022-12-06

## 2022-12-06 DIAGNOSIS — Z98.49 CATARACT EXTRACTION STATUS, UNSPECIFIED EYE: Chronic | ICD-10-CM

## 2022-12-06 DIAGNOSIS — C34.92 MALIGNANT NEOPLASM OF UNSPECIFIED PART OF LEFT BRONCHUS OR LUNG: ICD-10-CM

## 2022-12-06 DIAGNOSIS — C34.91 MALIGNANT NEOPLASM OF UNSPECIFIED PART OF RIGHT BRONCHUS OR LUNG: ICD-10-CM

## 2022-12-06 DIAGNOSIS — Z96.659 PRESENCE OF UNSPECIFIED ARTIFICIAL KNEE JOINT: Chronic | ICD-10-CM

## 2022-12-06 DIAGNOSIS — Z98.890 OTHER SPECIFIED POSTPROCEDURAL STATES: Chronic | ICD-10-CM

## 2022-12-06 PROCEDURE — 71046 X-RAY EXAM CHEST 2 VIEWS: CPT | Mod: 26

## 2022-12-06 PROCEDURE — 71046 X-RAY EXAM CHEST 2 VIEWS: CPT

## 2022-12-07 ENCOUNTER — NON-APPOINTMENT (OUTPATIENT)
Age: 72
End: 2022-12-07

## 2022-12-18 LAB
CANDIDA VAG CYTO: NOT DETECTED
G VAGINALIS+PREV SP MTYP VAG QL MICRO: NOT DETECTED
T VAGINALIS VAG QL WET PREP: NOT DETECTED

## 2022-12-20 ENCOUNTER — APPOINTMENT (OUTPATIENT)
Dept: THORACIC SURGERY | Facility: CLINIC | Age: 72
End: 2022-12-20

## 2022-12-22 ENCOUNTER — APPOINTMENT (OUTPATIENT)
Dept: THORACIC SURGERY | Facility: CLINIC | Age: 72
End: 2022-12-22

## 2022-12-22 PROCEDURE — 99443: CPT | Mod: 95

## 2022-12-22 NOTE — ASSESSMENT
[FreeTextEntry1] : Ms. Gaona is a 72 year old female. She is a former smoker (20 years, 1.5 PPD, Quit 1992) w/ hx of HTN, poliomyelitis, and partial thyroidectomy. Initially presented to office in November, 2020 for surgical evaluation of enlarging Left lung nodule. \par \par 12/3/2020: s/p Flexible bronch, uniportal Lt VATS, wedge rsx of LAINE x1, lingula sparing LULobectomy, MLND; Path of LAINE wedge resection revealing Invasive adenocarcinoma, acinar predominant with papillary subtype present; 2.4 cm; G2; All margins and all (0/23) lymph nodes negative; pT1c pN0 (Stage IA3). Path of LULobectomy: Negative for malignancy; multiple meningotheliod nodules. \par \par Now, s/p Flex bronch; uniportal Right VATS; RUL posterior segmentectomy; mediastinal and hilar lymph node dissection; intercostal nerve block on 4/22/21. Path revealing Adenocarcinoma, acinar predominant; G2; 1.1cm; All margins and LN (0/26) negative for tumor. pT1b pN0- Stage IA2. The presence of a lepidic pattern (AIS) indicates that this is a new primary adenocarcinoma of the lung.\par \par Here today with follow up CXR. \par \par I have independently reviewed the patient's medical records and diagnostic images at time of this office consultation and have made the following recommendation:\par 1. CXR findings are stable and reviewed with patient. Recommendation to return to clinic in 3 months with CT chest, no contrast to re-evaluate stability. \par \par Recommendations reviewed with patient during this office visit, and all questions answered; Patient instructed on the importance of follow up and verbalizes understanding.\par \par I, DANUTA CastIM, personally performed the evaluation and management (E/M) services for this established patient. That E/M includes assessing all new/exacerbated conditions, and establishing a new plan of care. Today, My ACP, Carmen Garcia, was here to observe my evaluation and management services for this patient to be followed going forward.\par \par \par

## 2022-12-22 NOTE — CONSULT LETTER
[FreeTextEntry2] : Dr. Arslan Larios (Pulm/Ref)  [FreeTextEntry3] : North Salmon MD, FACS \par Chief, Division of Thoracic Surgery \par Director, Minimally Invasive Thoracic Surgery \par Department of Cardiovascular and Thoracic Surgery \par [facility] \par , Cardiovascular and Thoracic Surgery\par \par \par

## 2022-12-22 NOTE — HISTORY OF PRESENT ILLNESS
[FreeTextEntry1] : Ms. Gaona is a 72 year old female. She is a former smoker (20 years, 1.5 PPD, Quit 1992) w/ hx of HTN, poliomyelitis, and partial thyroidectomy. Initially presented to office in November, 2020 for surgical evaluation of enlarging Left lung nodule. \par \par CT Chest on 09/04/2020 reveals: \par - Left apical 2.1 x 1.2 cm semisolid nodule, increased in size since prior study\par - Adjacent 6 mm semisolid nodule (4: 26), unchanged\par - RML 0.8 cm groundglass nodule (4: 55), unchanged\par - LLL 5 mm nodule (4: 77), also unchanged\par - Additional scattered bilateral groundglass nodules\par - 1.5 cm (3: 56) low-attenuation lesion in the pancreatic head \par \par PFTs on 10/23/2020 reveals:\par - FEV1: 2.38 (113%)\par - DLCO: 22.23 (109%)\par \par 12/3/2020: s/p Flexible bronch, uniportal Lt VATS, wedge rsx of LAINE x1, lingula sparing LULobectomy, MLND; Path of LAINE wedge resection revealing Invasive adenocarcinoma, acinar predominant with papillary subtype present; 2.4 cm; G2; All margins and all (0/23) lymph nodes negative; pT1c pN0 (Stage IA3). Path of LULobectomy: Negative for malignancy; multiple meningotheliod nodules. \par \par CT Chest on 4/1/21:\par - Post op changes\par - Enlarged RUL ground glass nodule; 1.1 x 0.9 (series 3, image 59); previously 1 x 0.7 cm. 0.6 cm\par - LLL subsolid nodule (series 3 image 56) is stable \par - Stable, mild subpleural nodularity within RLL (series 3 image 80)\par - Few other subpleural oval nodules representing benign intrapulmonary lymph nodes are also stable \par \par Now, s/p Flex bronch; uniportal Right VATS; RUL posterior segmentectomy; mediastinal and hilar lymph node dissection; intercostal nerve block on 4/22/21. Path revealing Adenocarcinoma, acinar predominant; G2; 1.1cm; All margins and LN (0/26) negative for tumor. pT1b pN0- Stage IA2. The presence of a lepidic pattern (AIS) indicates that this is a new primary adenocarcinoma of the lung.\par \par CT chest on 10/13/21:\par - Few stable lung nodules for instance 6 mm left lower lobe nodule on image 50 series 2. No new lung nodule. No pleural effusion.\par - Stable enlargement of the left thyroid lobe\par \par CXR on 12/10/2021:\par - Status post posterior RIGHT upper lobe segmentectomy.\par - No acute radiographic cardiopulmonary pathology.\par \par CT Chest on 4/12/22: \par - Postoperative changes \par - Few stable lung nodules for instance 6 mm LLL (2, 49) since 9/2020. No new lung nodule\par \par CXR on 7/11/22: chronic Rt upper zone linear fibrosis/atelectasis adjacent to the surgical suture line\par \par CT Chest on 10/11/22:\par - post-op changes\par - Few stable lung nodules:  6 mm LLL nodule (2:49) since 9/2020. \par - No new lung nodule. No pleural effusion.\par - No thoracic adenopathy.\par - Prior right hemithyroidectomy. \par \par CXR on 12/6/22:\par - No interval change\par - No radiographic evidence of active chest disease\par \par Patient is followed today via Telephonic visit. Today, patient denies worsening SOB, chest pain, cough, hemoptysis, fever, chills, night sweats, lightheadedness or dizziness.\par

## 2023-01-01 NOTE — PHYSICAL EXAM
[Chaperone Present] : A chaperone was present in the examining room during all aspects of the physical examination [Appropriately responsive] : appropriately responsive [Alert] : alert [No Acute Distress] : no acute distress [No Lymphadenopathy] : no lymphadenopathy [Regular Rate Rhythm] : regular rate rhythm [No Murmurs] : no murmurs [Clear to Auscultation B/L] : clear to auscultation bilaterally [Soft] : soft [Non-tender] : non-tender [Non-distended] : non-distended [No HSM] : No HSM [No Lesions] : no lesions [No Mass] : no mass [Oriented x3] : oriented x3 [Examination Of The Breasts] : a normal appearance [No Masses] : no breast masses were palpable [Labia Majora] : normal [Labia Minora] : normal [Erythematous] : erythema [Normal] : normal [Uterine Adnexae] : normal [No Discharge] : no discharge [Atrophy] : atrophy [No Tenderness] : no tenderness [Nl Sphincter Tone] : normal sphincter tone [FreeTextEntry9] : confirmed vaginal exam [FreeTextEntry4] : Affirm culture obtained.

## 2023-04-11 ENCOUNTER — OUTPATIENT (OUTPATIENT)
Dept: OUTPATIENT SERVICES | Facility: HOSPITAL | Age: 73
LOS: 1 days | End: 2023-04-11
Payer: MEDICARE

## 2023-04-11 ENCOUNTER — APPOINTMENT (OUTPATIENT)
Dept: CT IMAGING | Facility: IMAGING CENTER | Age: 73
End: 2023-04-11
Payer: MEDICARE

## 2023-04-11 DIAGNOSIS — Z96.659 PRESENCE OF UNSPECIFIED ARTIFICIAL KNEE JOINT: Chronic | ICD-10-CM

## 2023-04-11 DIAGNOSIS — C34.92 MALIGNANT NEOPLASM OF UNSPECIFIED PART OF LEFT BRONCHUS OR LUNG: ICD-10-CM

## 2023-04-11 DIAGNOSIS — Z98.890 OTHER SPECIFIED POSTPROCEDURAL STATES: Chronic | ICD-10-CM

## 2023-04-11 DIAGNOSIS — Z98.49 CATARACT EXTRACTION STATUS, UNSPECIFIED EYE: Chronic | ICD-10-CM

## 2023-04-11 PROCEDURE — 71250 CT THORAX DX C-: CPT

## 2023-04-11 PROCEDURE — 71250 CT THORAX DX C-: CPT | Mod: 26,MH

## 2023-04-18 ENCOUNTER — APPOINTMENT (OUTPATIENT)
Dept: THORACIC SURGERY | Facility: CLINIC | Age: 73
End: 2023-04-18
Payer: MEDICARE

## 2023-04-18 VITALS
SYSTOLIC BLOOD PRESSURE: 159 MMHG | DIASTOLIC BLOOD PRESSURE: 82 MMHG | BODY MASS INDEX: 35.44 KG/M2 | WEIGHT: 200 LBS | HEIGHT: 63 IN | HEART RATE: 80 BPM | OXYGEN SATURATION: 96 %

## 2023-04-18 PROCEDURE — 99214 OFFICE O/P EST MOD 30 MIN: CPT

## 2023-04-18 NOTE — HISTORY OF PRESENT ILLNESS
[FreeTextEntry1] : Ms. Gaona is a 72 year old female. She is a former smoker (20 years, 1.5 PPD, Quit 1992) w/ hx of HTN, poliomyelitis, and partial thyroidectomy. Initially presented to office in November, 2020 for surgical evaluation of enlarging Left lung nodule. \par \par CT Chest on 09/04/2020 reveals: \par - Left apical 2.1 x 1.2 cm semisolid nodule, increased in size since prior study\par - Adjacent 6 mm semisolid nodule (4: 26), unchanged\par - RML 0.8 cm groundglass nodule (4: 55), unchanged\par - LLL 5 mm nodule (4: 77), also unchanged\par - Additional scattered bilateral groundglass nodules\par - 1.5 cm (3: 56) low-attenuation lesion in the pancreatic head \par \par PFTs on 10/23/2020 reveals:\par - FEV1: 2.38 (113%)\par - DLCO: 22.23 (109%)\par \par 12/3/2020: s/p Flexible bronch, uniportal Lt VATS, wedge rsx of LAINE x1, lingula sparing LULobectomy, MLND; Path of LAINE wedge resection revealing Invasive adenocarcinoma, acinar predominant with papillary subtype present; 2.4 cm; G2; All margins and all (0/23) lymph nodes negative; pT1c pN0 (Stage IA3). Path of LULobectomy: Negative for malignancy; multiple meningotheliod nodules. \par \par CT Chest on 4/1/21:\par - Post op changes\par - Enlarged RUL ground glass nodule; 1.1 x 0.9 (series 3, image 59); previously 1 x 0.7 cm. 0.6 cm\par - LLL subsolid nodule (series 3 image 56) is stable \par - Stable, mild subpleural nodularity within RLL (series 3 image 80)\par - Few other subpleural oval nodules representing benign intrapulmonary lymph nodes are also stable \par \par Now, s/p Flex bronch; uniportal Right VATS; RUL posterior segmentectomy; mediastinal and hilar lymph node dissection; intercostal nerve block on 4/22/21. Path revealing Adenocarcinoma, acinar predominant; G2; 1.1cm; All margins and LN (0/26) negative for tumor. pT1b pN0- Stage IA2. The presence of a lepidic pattern (AIS) indicates that this is a new primary adenocarcinoma of the lung.\par \par CT chest on 10/13/21:\par - Few stable lung nodules for instance 6 mm left lower lobe nodule on image 50 series 2. No new lung nodule. No pleural effusion.\par - Stable enlargement of the left thyroid lobe\par \par CXR on 12/10/2021:\par - Status post posterior RIGHT upper lobe segmentectomy.\par - No acute radiographic cardiopulmonary pathology.\par \par CT Chest on 4/12/22: \par - Postoperative changes \par - Few stable lung nodules for instance 6 mm LLL (2, 49) since 9/2020. No new lung nodule\par \par CXR on 7/11/22: chronic Rt upper zone linear fibrosis/atelectasis adjacent to the surgical suture line\par \par CT Chest on 10/11/22:\par - post-op changes\par - Few stable lung nodules:  6 mm LLL nodule (2:49) since 9/2020. \par - No new lung nodule. No pleural effusion.\par - No thoracic adenopathy.\par - Prior right hemithyroidectomy. \par \par CXR on 12/6/22:\par - No interval change\par - No radiographic evidence of active chest disease\par \par CT Chest on 4/11/23:\par - Status post partial resection of RUL and lingular sparing LULobectomy with stable postsurgical changes\par - Stable opacity surrounding the chain sutures within the RUL\par -  Stable 6 mm LLL nodule. \par \par Patient is followed today for follow up. Patient denies shortness of breath, cough, chest pain, fever, chills.

## 2023-04-18 NOTE — ASSESSMENT
[FreeTextEntry1] : Ms. Gaona is a 72 year old female. She is a former smoker (20 years, 1.5 PPD, Quit 1992) w/ hx of HTN, poliomyelitis, and partial thyroidectomy. Initially presented to office in November, 2020 for surgical evaluation of enlarging Left lung nodule. \par \par 12/3/2020: s/p Flexible bronch, uniportal Lt VATS, wedge rsx of LAINE x1, lingula sparing LULobectomy, MLND; Path of LAINE wedge resection revealing Invasive adenocarcinoma, acinar predominant with papillary subtype present; 2.4 cm; G2; All margins and all (0/23) lymph nodes negative; pT1c pN0 (Stage IA3). Path of LULobectomy: Negative for malignancy; multiple meningotheliod nodules. \par \par Now, s/p Flex bronch; uniportal Right VATS; RUL posterior segmentectomy; mediastinal and hilar lymph node dissection; intercostal nerve block on 4/22/21. Path revealing Adenocarcinoma, acinar predominant; G2; 1.1cm; All margins and LN (0/26) negative for tumor. pT1b pN0- Stage IA2. The presence of a lepidic pattern (AIS) indicates that this is a new primary adenocarcinoma of the lung.\par \par I have reviewed the patient's medical records and diagnostic images at time of this office consultation and have made the following recommendation:\par 1. CT chest reviewed and explained to patient, I recommended patient to return to office in 6 months with CXR. \par \par I, ALDEN Cast, personally performed the evaluation and management (E/M) services for this established patient who follow up today with an existing condition.  That E/M includes conducting the examination, assessing all new/exacerbated/existing conditions, and establishing a plan of care.  Today, my ACP, JOANNE Otero, was here to observe my evaluation and management services for this existing condition to be followed going forward.

## 2023-04-18 NOTE — CONSULT LETTER
[Dear  ___] : Dear  [unfilled], [Courtesy Letter:] : I had the pleasure of seeing your patient, [unfilled], in my office today. [Please see my note below.] : Please see my note below. [Sincerely,] : Sincerely, [FreeTextEntry2] : Dr. Arslan Larios (Pulm/Ref)  [FreeTextEntry3] : North Salmon MD, FACS \par Chief, Division of Thoracic Surgery \par Director, Minimally Invasive Thoracic Surgery \par Department of Cardiovascular and Thoracic Surgery \par [facility] \par , Cardiovascular and Thoracic Surgery\par \par \par

## 2023-06-02 ENCOUNTER — APPOINTMENT (OUTPATIENT)
Dept: UROLOGY | Facility: CLINIC | Age: 73
End: 2023-06-02

## 2023-06-02 ENCOUNTER — APPOINTMENT (OUTPATIENT)
Dept: UROLOGY | Facility: CLINIC | Age: 73
End: 2023-06-02
Payer: MEDICARE

## 2023-06-02 VITALS
SYSTOLIC BLOOD PRESSURE: 127 MMHG | HEIGHT: 63 IN | TEMPERATURE: 97.8 F | BODY MASS INDEX: 35.44 KG/M2 | DIASTOLIC BLOOD PRESSURE: 70 MMHG | RESPIRATION RATE: 17 BRPM | HEART RATE: 84 BPM | WEIGHT: 200 LBS

## 2023-06-02 PROCEDURE — 99203 OFFICE O/P NEW LOW 30 MIN: CPT

## 2023-06-02 NOTE — HISTORY OF PRESENT ILLNESS
[FreeTextEntry1] : Patient is a 72 year old female who was found to have a renal mass in the setting of pancreas cyst monitoring. \par  Denies hematuria or any other urinary symptoms . 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

## 2023-06-02 NOTE — ASSESSMENT
[FreeTextEntry1] : Patient is a 72 year old female presented for evaluation of right renal mass \par \par renal mass\par We reviewed the images and reports . We reviewed the possibe underlying histology of solid enhancing renal masses . We discussed the risk of malignancy  vs benign . We discussed the possibility/ role of percutaneous biopsy ,with the associated risks, benefits , complications and accuracy issues ( risk of false negative). \par The natural history and biology of renal cell carcinoma was discussed. \par Options were reviewed including ,not limited to,active surveillance, surgical extirpation and ablation . The risk of tumor growth and metastasis on active surveillance were reviewed .\par Options were reviewed including , not limited to,active surveillance ( AS ) surgical extirpation and ablation. The risk of tumor growth and metastasis on AS could mean missing the opportunity for cure was discussed \par With respect to treatment we reviewed  ablation ( cryoablation, radiofrequency ablation) risks of recurrence,opportunities for salvage treatment and imaging requirements for follow up based on he pathological findings . Oncologic outcomes for ablation were reviewed .\par With respect for surgery we reviewed nephron sparing surgery vs radical nephrectomy  as well as minimally invasive laparoscopic  approach surgery and the possibility of converting to an open procedure . \par Risks of surgical complications were reviewed, including but not limited to : bleeding/ life threatening hemorrhage, vascular/bowel/adjacent visceral organ injury,trocar access injury, the possibility of recognized vs unrecognized delayed recognition injury, risks of thermal /blunt/sharp/retraction injury. We reviewed the risk of DVT, PE, MI ,death,risks of cardiopulmonary/anesthesia related complications,positional injury,infection/collection/abscess,wound complications/dehiscence,urinoma/fistula, ureteral injury/obstruction as well as other complications \par Written materials regarding preop clear liquid diet and bowel prep were given to patient . \par Booking form entered\par

## 2023-06-02 NOTE — PHYSICAL EXAM
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

## 2023-06-02 NOTE — REVIEW OF SYSTEMS
[Eyesight Problems] : eyesight problems [Dry Eyes] : dryness of the eyes [Joint Pain] : joint pain [Limb Weakness] : limb weakness [Difficulty Walking] : difficulty walking [Negative] : Heme/Lymph

## 2023-06-20 ENCOUNTER — OUTPATIENT (OUTPATIENT)
Dept: OUTPATIENT SERVICES | Facility: HOSPITAL | Age: 73
LOS: 1 days | End: 2023-06-20
Payer: MEDICARE

## 2023-06-20 VITALS
DIASTOLIC BLOOD PRESSURE: 74 MMHG | OXYGEN SATURATION: 96 % | RESPIRATION RATE: 18 BRPM | HEIGHT: 63 IN | WEIGHT: 203.93 LBS | HEART RATE: 77 BPM | TEMPERATURE: 98 F | SYSTOLIC BLOOD PRESSURE: 126 MMHG

## 2023-06-20 DIAGNOSIS — E03.9 HYPOTHYROIDISM, UNSPECIFIED: ICD-10-CM

## 2023-06-20 DIAGNOSIS — Z98.49 CATARACT EXTRACTION STATUS, UNSPECIFIED EYE: Chronic | ICD-10-CM

## 2023-06-20 DIAGNOSIS — K21.9 GASTRO-ESOPHAGEAL REFLUX DISEASE WITHOUT ESOPHAGITIS: ICD-10-CM

## 2023-06-20 DIAGNOSIS — Z96.659 PRESENCE OF UNSPECIFIED ARTIFICIAL KNEE JOINT: Chronic | ICD-10-CM

## 2023-06-20 DIAGNOSIS — I10 ESSENTIAL (PRIMARY) HYPERTENSION: ICD-10-CM

## 2023-06-20 DIAGNOSIS — N28.89 OTHER SPECIFIED DISORDERS OF KIDNEY AND URETER: ICD-10-CM

## 2023-06-20 DIAGNOSIS — G47.33 OBSTRUCTIVE SLEEP APNEA (ADULT) (PEDIATRIC): ICD-10-CM

## 2023-06-20 DIAGNOSIS — Z98.890 OTHER SPECIFIED POSTPROCEDURAL STATES: Chronic | ICD-10-CM

## 2023-06-20 LAB
ALBUMIN SERPL ELPH-MCNC: 4.5 G/DL — SIGNIFICANT CHANGE UP (ref 3.3–5)
ALP SERPL-CCNC: 88 U/L — SIGNIFICANT CHANGE UP (ref 40–120)
ALT FLD-CCNC: 26 U/L — SIGNIFICANT CHANGE UP (ref 4–33)
ANION GAP SERPL CALC-SCNC: 15 MMOL/L — HIGH (ref 7–14)
AST SERPL-CCNC: 27 U/L — SIGNIFICANT CHANGE UP (ref 4–32)
BILIRUB SERPL-MCNC: 0.6 MG/DL — SIGNIFICANT CHANGE UP (ref 0.2–1.2)
BLD GP AB SCN SERPL QL: NEGATIVE — SIGNIFICANT CHANGE UP
BUN SERPL-MCNC: 11 MG/DL — SIGNIFICANT CHANGE UP (ref 7–23)
CALCIUM SERPL-MCNC: 9.5 MG/DL — SIGNIFICANT CHANGE UP (ref 8.4–10.5)
CHLORIDE SERPL-SCNC: 100 MMOL/L — SIGNIFICANT CHANGE UP (ref 98–107)
CO2 SERPL-SCNC: 22 MMOL/L — SIGNIFICANT CHANGE UP (ref 22–31)
CREAT SERPL-MCNC: 0.5 MG/DL — SIGNIFICANT CHANGE UP (ref 0.5–1.3)
EGFR: 98 ML/MIN/1.73M2 — SIGNIFICANT CHANGE UP
GLUCOSE SERPL-MCNC: 107 MG/DL — HIGH (ref 70–99)
HCT VFR BLD CALC: 44.9 % — SIGNIFICANT CHANGE UP (ref 34.5–45)
HGB BLD-MCNC: 15.1 G/DL — SIGNIFICANT CHANGE UP (ref 11.5–15.5)
MCHC RBC-ENTMCNC: 29.7 PG — SIGNIFICANT CHANGE UP (ref 27–34)
MCHC RBC-ENTMCNC: 33.6 GM/DL — SIGNIFICANT CHANGE UP (ref 32–36)
MCV RBC AUTO: 88.4 FL — SIGNIFICANT CHANGE UP (ref 80–100)
NRBC # BLD: 0 /100 WBCS — SIGNIFICANT CHANGE UP (ref 0–0)
NRBC # FLD: 0 K/UL — SIGNIFICANT CHANGE UP (ref 0–0)
PLATELET # BLD AUTO: 276 K/UL — SIGNIFICANT CHANGE UP (ref 150–400)
POTASSIUM SERPL-MCNC: 3.8 MMOL/L — SIGNIFICANT CHANGE UP (ref 3.5–5.3)
POTASSIUM SERPL-SCNC: 3.8 MMOL/L — SIGNIFICANT CHANGE UP (ref 3.5–5.3)
PROT SERPL-MCNC: 7.7 G/DL — SIGNIFICANT CHANGE UP (ref 6–8.3)
RBC # BLD: 5.08 M/UL — SIGNIFICANT CHANGE UP (ref 3.8–5.2)
RBC # FLD: 13.2 % — SIGNIFICANT CHANGE UP (ref 10.3–14.5)
RH IG SCN BLD-IMP: POSITIVE — SIGNIFICANT CHANGE UP
SODIUM SERPL-SCNC: 137 MMOL/L — SIGNIFICANT CHANGE UP (ref 135–145)
WBC # BLD: 7.31 K/UL — SIGNIFICANT CHANGE UP (ref 3.8–10.5)
WBC # FLD AUTO: 7.31 K/UL — SIGNIFICANT CHANGE UP (ref 3.8–10.5)

## 2023-06-20 PROCEDURE — 93010 ELECTROCARDIOGRAM REPORT: CPT

## 2023-06-20 RX ORDER — POTASSIUM CHLORIDE 20 MEQ
0 PACKET (EA) ORAL
Qty: 0 | Refills: 0 | DISCHARGE

## 2023-06-20 RX ORDER — ATORVASTATIN CALCIUM 80 MG/1
1 TABLET, FILM COATED ORAL
Qty: 0 | Refills: 0 | DISCHARGE

## 2023-06-20 RX ORDER — LOSARTAN/HYDROCHLOROTHIAZIDE 100MG-25MG
1 TABLET ORAL
Qty: 0 | Refills: 0 | DISCHARGE

## 2023-06-20 RX ORDER — AMLODIPINE BESYLATE 2.5 MG/1
1 TABLET ORAL
Qty: 0 | Refills: 0 | DISCHARGE

## 2023-06-20 RX ORDER — ATENOLOL 25 MG/1
1 TABLET ORAL
Qty: 0 | Refills: 0 | DISCHARGE

## 2023-06-20 RX ORDER — LEVOTHYROXINE SODIUM 125 MCG
1 TABLET ORAL
Qty: 0 | Refills: 0 | DISCHARGE

## 2023-06-20 RX ORDER — MULTIVIT-MIN/FERROUS GLUCONATE 9 MG/15 ML
1 LIQUID (ML) ORAL
Qty: 0 | Refills: 0 | DISCHARGE

## 2023-06-20 RX ORDER — SODIUM CHLORIDE 9 MG/ML
1000 INJECTION, SOLUTION INTRAVENOUS
Refills: 0 | Status: DISCONTINUED | OUTPATIENT
Start: 2023-06-28 | End: 2023-06-28

## 2023-06-20 NOTE — H&P PST ADULT - PROBLEM SELECTOR PLAN 1
Scheduled for right laparoscopic partial nephrectomy   Preop instructions provided and patient verbalizes understanding.  Labs done and results pending.  Hibiclens provided with instructions and was signed by patient. Teach-back method was utilized to assess patient's understanding. Patient verbalized understanding.  Medical clearance requested by Dr Walters and pst for advanced age .

## 2023-06-20 NOTE — H&P PST ADULT - NEGATIVE RESPIRATORY AND THORAX SYMPTOMS
Is the patient between the ages of 15-40? Yes  Is the patient experiencing or recently experienced symptoms of psychosis? Yes  How long has pt been taking anti-psychotics? Patient has had two short trials of antipsychotics, but D/Q after days due to side effects.    Patient was evaluated by [facility] in June 2018 and recommended to consider First Episode Psychosis Program for treatment.      Please read to the patient/family: Our FEP Program is offering a two tiered assessment process. The first appointment is with an experienced FEP clinician to complete an initial screening, explore what services someone is interested in and assist in addressing immediate questions/concerns rather than having to wait until the diagnostic assessment appointment which can be a matter of weeks. The screening will also help determine which program someone may be eligible for and with whom the diagnostic assessment should be scheduled with. The overall goal is to help people as efficiently and effectively as possible. In preparation for the appointment we ask that you request any/all behavioral health records be faxed to [phone]. Thank you.     no wheezing/no dyspnea/no cough

## 2023-06-20 NOTE — H&P PST ADULT - LOCATION OF BACK PAIN
5/7/21-- PT HAS CALLED AND LEFT VM THAT PA NEEDS TO BE DONE ON [facility]-- [facility] DID PA THE OTHER DAY AND WE HAVE NOT GOTTEN BACK APPROVAL OR DENIAL YET-- CHECKED ON COVER MY MEDS AND NOTHING IS THERE-- WHAT TO TELL PT OR RX--- PT CALL BACK #[phone]-- PT WAS DUE MEDS ON [facility] AND HAS NOT GOT THEM YET   non radiating ; weakness of right leg from history of polio/lumbar spine

## 2023-06-20 NOTE — H&P PST ADULT - NSICDXPASTMEDICALHX_GEN_ALL_CORE_FT
PAST MEDICAL HISTORY:  Arthritis     Chronic gout     Diverticulosis     Dyslipidemia     GERD (gastroesophageal reflux disease)     h/o Polio in childhood RLE weakness, uses cane to ambulate    History of macular degeneration     HTN (hypertension)     Hypothyroidism     Lung cancer left    Obesity     S/P lumbar laminectomy     Solitary pulmonary nodule     Spinal stenosis     Spondylolisthesis     Thyroid nodule

## 2023-06-20 NOTE — H&P PST ADULT - HISTORY OF PRESENT ILLNESS
This is a 72 y.o. female with other specified disorders of kidney and ureter . Pt has a pancreatic cyst with surveillance CT , last 5/23 . Pt evaluated by Dr Walters . Pt is scheduled for surgery 6/28/23 .

## 2023-06-21 ENCOUNTER — NON-APPOINTMENT (OUTPATIENT)
Age: 73
End: 2023-06-21

## 2023-06-21 LAB
CULTURE RESULTS: SIGNIFICANT CHANGE UP
SPECIMEN SOURCE: SIGNIFICANT CHANGE UP

## 2023-06-27 ENCOUNTER — TRANSCRIPTION ENCOUNTER (OUTPATIENT)
Age: 73
End: 2023-06-27

## 2023-06-27 NOTE — ASU PATIENT PROFILE, ADULT - FALL HARM RISK - RISK INTERVENTIONS

## 2023-06-28 ENCOUNTER — APPOINTMENT (OUTPATIENT)
Dept: UROLOGY | Facility: HOSPITAL | Age: 73
End: 2023-06-28

## 2023-06-28 ENCOUNTER — INPATIENT (INPATIENT)
Facility: HOSPITAL | Age: 73
LOS: 0 days | Discharge: HOME | End: 2023-06-29
Attending: UROLOGY | Admitting: UROLOGY
Payer: MEDICARE

## 2023-06-28 ENCOUNTER — RESULT REVIEW (OUTPATIENT)
Age: 73
End: 2023-06-28

## 2023-06-28 VITALS
HEART RATE: 81 BPM | RESPIRATION RATE: 18 BRPM | OXYGEN SATURATION: 98 % | WEIGHT: 203.93 LBS | SYSTOLIC BLOOD PRESSURE: 127 MMHG | TEMPERATURE: 99 F | DIASTOLIC BLOOD PRESSURE: 76 MMHG | HEIGHT: 63 IN

## 2023-06-28 DIAGNOSIS — K21.9 GASTRO-ESOPHAGEAL REFLUX DISEASE WITHOUT ESOPHAGITIS: ICD-10-CM

## 2023-06-28 DIAGNOSIS — I10 ESSENTIAL (PRIMARY) HYPERTENSION: ICD-10-CM

## 2023-06-28 DIAGNOSIS — E78.5 HYPERLIPIDEMIA, UNSPECIFIED: ICD-10-CM

## 2023-06-28 DIAGNOSIS — Z98.49 CATARACT EXTRACTION STATUS, UNSPECIFIED EYE: Chronic | ICD-10-CM

## 2023-06-28 DIAGNOSIS — Z98.890 OTHER SPECIFIED POSTPROCEDURAL STATES: Chronic | ICD-10-CM

## 2023-06-28 DIAGNOSIS — N28.89 OTHER SPECIFIED DISORDERS OF KIDNEY AND URETER: ICD-10-CM

## 2023-06-28 DIAGNOSIS — Z85.118 PERSONAL HISTORY OF OTHER MALIGNANT NEOPLASM OF BRONCHUS AND LUNG: ICD-10-CM

## 2023-06-28 DIAGNOSIS — E03.9 HYPOTHYROIDISM, UNSPECIFIED: ICD-10-CM

## 2023-06-28 DIAGNOSIS — Z96.659 PRESENCE OF UNSPECIFIED ARTIFICIAL KNEE JOINT: Chronic | ICD-10-CM

## 2023-06-28 PROCEDURE — 88313 SPECIAL STAINS GROUP 2: CPT | Mod: 26

## 2023-06-28 PROCEDURE — 88307 TISSUE EXAM BY PATHOLOGIST: CPT | Mod: 26

## 2023-06-28 PROCEDURE — 99223 1ST HOSP IP/OBS HIGH 75: CPT

## 2023-06-28 PROCEDURE — 88331 PATH CONSLTJ SURG 1 BLK 1SPC: CPT | Mod: 26

## 2023-06-28 PROCEDURE — 88305 TISSUE EXAM BY PATHOLOGIST: CPT | Mod: 26

## 2023-06-28 DEVICE — CLIP APPLIER COVIDIEN ENDOCLIP II 10MM MED/LG: Type: IMPLANTABLE DEVICE | Site: RIGHT | Status: FUNCTIONAL

## 2023-06-28 DEVICE — LIGATING CLIPS WECK HEMOLOK POLYMER LARGE (PURPLE) 6: Type: IMPLANTABLE DEVICE | Site: RIGHT | Status: FUNCTIONAL

## 2023-06-28 DEVICE — SURGICEL 2 X 14": Type: IMPLANTABLE DEVICE | Site: RIGHT | Status: FUNCTIONAL

## 2023-06-28 RX ORDER — ATENOLOL 25 MG/1
75 TABLET ORAL DAILY
Refills: 0 | Status: DISCONTINUED | OUTPATIENT
Start: 2023-06-28 | End: 2023-06-28

## 2023-06-28 RX ORDER — LOSARTAN POTASSIUM 100 MG/1
25 TABLET, FILM COATED ORAL DAILY
Refills: 0 | Status: DISCONTINUED | OUTPATIENT
Start: 2023-06-28 | End: 2023-06-29

## 2023-06-28 RX ORDER — AMLODIPINE BESYLATE 2.5 MG/1
10 TABLET ORAL DAILY
Refills: 0 | Status: DISCONTINUED | OUTPATIENT
Start: 2023-06-29 | End: 2023-06-29

## 2023-06-28 RX ORDER — OMEGA-3 ACID ETHYL ESTERS 1 G
2 CAPSULE ORAL
Refills: 0 | Status: DISCONTINUED | OUTPATIENT
Start: 2023-06-28 | End: 2023-06-29

## 2023-06-28 RX ORDER — ALBUMIN HUMAN 25 %
250 VIAL (ML) INTRAVENOUS ONCE
Refills: 0 | Status: COMPLETED | OUTPATIENT
Start: 2023-06-28 | End: 2023-06-28

## 2023-06-28 RX ORDER — KETOROLAC TROMETHAMINE 30 MG/ML
15 SYRINGE (ML) INJECTION EVERY 6 HOURS
Refills: 0 | Status: DISCONTINUED | OUTPATIENT
Start: 2023-06-28 | End: 2023-06-29

## 2023-06-28 RX ORDER — LEVOTHYROXINE SODIUM 125 MCG
1 TABLET ORAL
Refills: 0 | DISCHARGE

## 2023-06-28 RX ORDER — SODIUM CHLORIDE 9 MG/ML
1000 INJECTION, SOLUTION INTRAVENOUS
Refills: 0 | Status: DISCONTINUED | OUTPATIENT
Start: 2023-06-28 | End: 2023-06-29

## 2023-06-28 RX ORDER — HEPARIN SODIUM 5000 [USP'U]/ML
5000 INJECTION INTRAVENOUS; SUBCUTANEOUS THREE TIMES A DAY
Refills: 0 | Status: DISCONTINUED | OUTPATIENT
Start: 2023-06-28 | End: 2023-06-29

## 2023-06-28 RX ORDER — CHOLECALCIFEROL (VITAMIN D3) 125 MCG
1000 CAPSULE ORAL DAILY
Refills: 0 | Status: DISCONTINUED | OUTPATIENT
Start: 2023-06-28 | End: 2023-06-29

## 2023-06-28 RX ORDER — LOSARTAN POTASSIUM 100 MG/1
1 TABLET, FILM COATED ORAL
Refills: 0 | DISCHARGE

## 2023-06-28 RX ORDER — ATENOLOL 25 MG/1
75 TABLET ORAL DAILY
Refills: 0 | Status: DISCONTINUED | OUTPATIENT
Start: 2023-06-28 | End: 2023-06-29

## 2023-06-28 RX ORDER — AMLODIPINE BESYLATE 2.5 MG/1
1 TABLET ORAL
Refills: 0 | DISCHARGE

## 2023-06-28 RX ORDER — ONDANSETRON 8 MG/1
4 TABLET, FILM COATED ORAL ONCE
Refills: 0 | Status: DISCONTINUED | OUTPATIENT
Start: 2023-06-28 | End: 2023-06-28

## 2023-06-28 RX ORDER — ACETAMINOPHEN 500 MG
1000 TABLET ORAL EVERY 6 HOURS
Refills: 0 | Status: COMPLETED | OUTPATIENT
Start: 2023-06-28 | End: 2023-06-29

## 2023-06-28 RX ORDER — SPIRONOLACTONE 25 MG/1
25 TABLET, FILM COATED ORAL DAILY
Refills: 0 | Status: DISCONTINUED | OUTPATIENT
Start: 2023-06-28 | End: 2023-06-29

## 2023-06-28 RX ORDER — PANTOPRAZOLE SODIUM 20 MG/1
1 TABLET, DELAYED RELEASE ORAL
Refills: 0 | DISCHARGE

## 2023-06-28 RX ORDER — SPIRONOLACTONE 25 MG/1
1 TABLET, FILM COATED ORAL
Refills: 0 | DISCHARGE

## 2023-06-28 RX ORDER — CHOLECALCIFEROL (VITAMIN D3) 125 MCG
1 CAPSULE ORAL
Qty: 0 | Refills: 0 | DISCHARGE

## 2023-06-28 RX ORDER — LEVOTHYROXINE SODIUM 125 MCG
75 TABLET ORAL DAILY
Refills: 0 | Status: DISCONTINUED | OUTPATIENT
Start: 2023-06-28 | End: 2023-06-28

## 2023-06-28 RX ORDER — ACETAMINOPHEN 500 MG
975 TABLET ORAL EVERY 6 HOURS
Refills: 0 | Status: DISCONTINUED | OUTPATIENT
Start: 2023-06-28 | End: 2023-06-28

## 2023-06-28 RX ORDER — HYDROMORPHONE HYDROCHLORIDE 2 MG/ML
0.5 INJECTION INTRAMUSCULAR; INTRAVENOUS; SUBCUTANEOUS
Refills: 0 | Status: DISCONTINUED | OUTPATIENT
Start: 2023-06-28 | End: 2023-06-28

## 2023-06-28 RX ORDER — OMEGA-3 ACID ETHYL ESTERS 1 G
2 CAPSULE ORAL
Refills: 0 | DISCHARGE

## 2023-06-28 RX ORDER — ATORVASTATIN CALCIUM 80 MG/1
1 TABLET, FILM COATED ORAL
Refills: 0 | DISCHARGE

## 2023-06-28 RX ORDER — SPIRONOLACTONE 25 MG/1
25 TABLET, FILM COATED ORAL DAILY
Refills: 0 | Status: DISCONTINUED | OUTPATIENT
Start: 2023-06-28 | End: 2023-06-28

## 2023-06-28 RX ORDER — PANTOPRAZOLE SODIUM 20 MG/1
40 TABLET, DELAYED RELEASE ORAL
Refills: 0 | Status: DISCONTINUED | OUTPATIENT
Start: 2023-06-28 | End: 2023-06-29

## 2023-06-28 RX ORDER — ATORVASTATIN CALCIUM 80 MG/1
10 TABLET, FILM COATED ORAL AT BEDTIME
Refills: 0 | Status: DISCONTINUED | OUTPATIENT
Start: 2023-06-28 | End: 2023-06-29

## 2023-06-28 RX ORDER — LOSARTAN POTASSIUM 100 MG/1
25 TABLET, FILM COATED ORAL DAILY
Refills: 0 | Status: DISCONTINUED | OUTPATIENT
Start: 2023-06-28 | End: 2023-06-28

## 2023-06-28 RX ORDER — LEVOTHYROXINE SODIUM 125 MCG
88 TABLET ORAL DAILY
Refills: 0 | Status: DISCONTINUED | OUTPATIENT
Start: 2023-06-28 | End: 2023-06-29

## 2023-06-28 RX ADMIN — ATORVASTATIN CALCIUM 10 MILLIGRAM(S): 80 TABLET, FILM COATED ORAL at 21:29

## 2023-06-28 RX ADMIN — HYDROMORPHONE HYDROCHLORIDE 0.5 MILLIGRAM(S): 2 INJECTION INTRAMUSCULAR; INTRAVENOUS; SUBCUTANEOUS at 09:15

## 2023-06-28 RX ADMIN — SODIUM CHLORIDE 125 MILLILITER(S): 9 INJECTION, SOLUTION INTRAVENOUS at 09:40

## 2023-06-28 RX ADMIN — Medication 400 MILLIGRAM(S): at 20:19

## 2023-06-28 RX ADMIN — Medication 1000 UNIT(S): at 17:06

## 2023-06-28 RX ADMIN — HYDROMORPHONE HYDROCHLORIDE 0.5 MILLIGRAM(S): 2 INJECTION INTRAMUSCULAR; INTRAVENOUS; SUBCUTANEOUS at 09:45

## 2023-06-28 RX ADMIN — Medication 2 GRAM(S): at 17:05

## 2023-06-28 RX ADMIN — HYDROMORPHONE HYDROCHLORIDE 0.5 MILLIGRAM(S): 2 INJECTION INTRAMUSCULAR; INTRAVENOUS; SUBCUTANEOUS at 09:05

## 2023-06-28 RX ADMIN — Medication 15 MILLIGRAM(S): at 17:06

## 2023-06-28 RX ADMIN — Medication 1000 MILLIGRAM(S): at 20:55

## 2023-06-28 RX ADMIN — Medication 1000 MILLIGRAM(S): at 15:22

## 2023-06-28 RX ADMIN — Medication 500 MILLILITER(S): at 10:49

## 2023-06-28 RX ADMIN — HEPARIN SODIUM 5000 UNIT(S): 5000 INJECTION INTRAVENOUS; SUBCUTANEOUS at 14:36

## 2023-06-28 RX ADMIN — Medication 15 MILLIGRAM(S): at 17:16

## 2023-06-28 RX ADMIN — HYDROMORPHONE HYDROCHLORIDE 0.5 MILLIGRAM(S): 2 INJECTION INTRAMUSCULAR; INTRAVENOUS; SUBCUTANEOUS at 10:00

## 2023-06-28 RX ADMIN — Medication 400 MILLIGRAM(S): at 14:36

## 2023-06-28 RX ADMIN — HEPARIN SODIUM 5000 UNIT(S): 5000 INJECTION INTRAVENOUS; SUBCUTANEOUS at 21:29

## 2023-06-28 RX ADMIN — Medication 15 MILLIGRAM(S): at 23:01

## 2023-06-28 NOTE — PATIENT PROFILE ADULT - FALL HARM RISK - HARM RISK INTERVENTIONS
Assistance with ambulation/Assistance OOB with selected safe patient handling equipment/Communicate Risk of Fall with Harm to all staff/Discuss with provider need for PT consult/Monitor gait and stability/Provide patient with walking aids - walker, cane, crutches/Reinforce activity limits and safety measures with patient and family/Sit up slowly, dangle for a short time, stand at bedside before walking/Tailored Fall Risk Interventions/Use of alarms - bed, chair and/or voice tab/Visual Cue: Yellow wristband and red socks/Bed in lowest position, wheels locked, appropriate side rails in place/Call bell, personal items and telephone in reach/Instruct patient to call for assistance before getting out of bed or chair/Non-slip footwear when patient is out of bed/[place] to call system/Physically safe environment - no spills, clutter or unnecessary equipment/Purposeful Proactive Rounding/Room/bathroom lighting operational, light cord in reach

## 2023-06-28 NOTE — CONSULT NOTE ADULT - ASSESSMENT
72 y.o. female with h/o HTN, HLD, polio, hypothyroidism, lung ca s/p L lobectomy, gout, stable pancreatic mass/cyst, right renal mass, s/p right lap partial nephrectomy on 6/28/23. 72 y.o. female with h/o HTN, HLD, polio in childhood with chronic RLE weakness, hypothyroidism, lung ca s/p L lobectomy, gout, stable pancreatic mass/cyst, right renal mass, s/p right lap partial nephrectomy on 6/28/23.

## 2023-06-28 NOTE — PATIENT PROFILE ADULT - FALL HARM RISK - RISK INTERVENTIONS
Assistance OOB with selected safe patient handling equipment/Assistance with ambulation/Communicate Fall Risk and Risk Factors to all staff, patient, and family/Discuss with provider need for PT consult/Monitor gait and stability/Provide patient with walking aids - walker, cane, crutches/Reinforce activity limits and safety measures with patient and family/Sit up slowly, dangle for a short time, stand at bedside before walking/Use of alarms - bed, chair and/or voice tab/Visual Cue: Yellow wristband/Bed in lowest position, wheels locked, appropriate side rails in place/Call bell, personal items and telephone in reach/Instruct patient to call for assistance before getting out of bed or chair/Non-slip footwear when patient is out of bed/[place] to call system/Physically safe environment - no spills, clutter or unnecessary equipment/Purposeful Proactive Rounding/Room/bathroom lighting operational, light cord in reach

## 2023-06-28 NOTE — PATIENT PROFILE ADULT - FALL HARM RISK - FACTORS
right leg weakness s/p polio/IV and/or equipment tethered to patient/Post Op right leg weakness s/p polio/Post Op

## 2023-06-28 NOTE — CONSULT NOTE ADULT - SUBJECTIVE AND OBJECTIVE BOX
Taylor Miller MD  Pager [phone]    HPI:  72 y.o. female with h/o HTN, HLD, polio, hypothyroidism, lung ca s/p L lobectomy, gout, stable pancreatic mass/cyst, right renal mass, admitted for right partial nephrectomy on 23.    Patient seen postop in pacu, hemodynamically stable, pain controlled, denies chest pain/sob/dizziness.     PAST MEDICAL & SURGICAL HISTORY:  Dyslipidemia      HTN (hypertension)      Hypothyroidism      Obesity      Diverticulosis      h/o Polio in childhood  RLE weakness, uses cane to ambulate      Chronic gout      Spinal stenosis      S/P lumbar laminectomy      Arthritis      Spondylolisthesis      History of macular degeneration      Solitary pulmonary nodule      Lung cancer  left      Thyroid nodule      GERD (gastroesophageal reflux disease)      s/p right knee arthroscopy for meniscus tear  ,       S/P bilateral salpingo-oophorectomy    secondary to ovarian cyst      S/P D&C  2004      S/P  Section  ,       surgical repair of left leg growth plate  secondary to polio        surgical repair of right foot  secondary to polio        H/O partial thyroidectomy        H/O lumbar L5-L6 laminectomy  2003      umbilical Hernia repair with mesh  2010      S/P TKR (total knee replacement)  Left       S/P cataract surgery  2014      History of lung surgery  LULobectomy 2020          Review of Systems:   CONSTITUTIONAL: No fever, weight loss, or fatigue  EYES: No eye pain, visual disturbances, or discharge  ENMT:  No difficulty hearing, tinnitus, vertigo; No sinus or throat pain  NECK: No pain or stiffness  BREASTS: No pain, masses, or nipple discharge  RESPIRATORY: No cough, wheezing, chills or hemoptysis; No shortness of breath  CARDIOVASCULAR: No chest pain, palpitations, dizziness, or leg swelling  GASTROINTESTINAL: No abdominal or epigastric pain. No nausea, vomiting, or hematemesis; No diarrhea or constipation. No melena or hematochezia.  GENITOURINARY: No dysuria, frequency, hematuria, or incontinence  NEUROLOGICAL: No headaches, memory loss, loss of strength, numbness, or tremors  SKIN: No itching, burning, rashes, or lesions   LYMPH NODES: No enlarged glands  ENDOCRINE: No heat or cold intolerance; No hair loss  MUSCULOSKELETAL: back pain, difficulty walking, right leg weakness   PSYCHIATRIC: No depression, anxiety, mood swings, or difficulty sleeping  HEME/LYMPH: No easy bruising, or bleeding gums  ALLERY AND IMMUNOLOGIC: No hives or eczema    Allergies    oxycodone (Vomiting; Nausea)    Intolerances        Social History: former smoker 1 ppd x15 yrs, quit , 1-2 drinks wine 2-4 times/month    FAMILY HISTORY:  FH: type 2 diabetes  mother        Home Medications:  amLODIPine 10 mg oral tablet: 1 tab(s) orally once a day (in the morning) (:44)  Atenolol 75 mg once a day at bedtime:  (44)  atorvastatin 10 mg oral tablet: 1 tab(s) orally once a day (at bedtime) ()  calcium: 600 milligram(s) orally once a day ()  Centrum 1 tab daily:  ()  levothyroxine 75 mcg (0.075 mg) oral tablet: 1 tab(s) orally 2 times a week AM ()  levothyroxine 88 mcg (0.088 mg) oral tablet: 1 tab(s) orally 5 times a week AM ()  losartan 100 mg oral tablet: 1 tab(s) orally once a day (in the morning) (:44)  Omega-3 1000 mg oral capsule: 2 cap(s) orally 2 times a day ()  pantoprazole 20 mg oral delayed release tablet: 1 tab(s) orally once a day (in the morning) (:)  spironolactone 25 mg oral tablet: 1 tab(s) orally once a day (in the morning) (44)  Vitamin C 1 tab daily:  ()  Vitamin D3 1000 intl units (25 mcg) oral tablet: 1 tab(s) orally once a day ()  Zinc 1 tab daily:  ()      MEDICATIONS  (STANDING):  acetaminophen   IVPB .. 1000 milliGRAM(s) IV Intermittent every 6 hours  atenolol  Tablet 75 milliGRAM(s) Oral daily  atorvastatin 10 milliGRAM(s) Oral at bedtime  cholecalciferol 1000 Unit(s) Oral daily  heparin   Injectable 5000 Unit(s) SubCutaneous three times a day  ketorolac   Injectable 15 milliGRAM(s) IV Push every 6 hours  lactated ringers. 1000 milliLiter(s) (125 mL/Hr) IV Continuous <Continuous>  lactated ringers. 1000 milliLiter(s) (30 mL/Hr) IV Continuous <Continuous>  levothyroxine 75 MICROGram(s) Oral daily  levothyroxine 88 MICROGram(s) Oral daily  losartan 25 milliGRAM(s) Oral daily  omega-3-Acid Ethyl Esters 2 Gram(s) Oral two times a day  pantoprazole    Tablet 40 milliGRAM(s) Oral before breakfast  spironolactone 25 milliGRAM(s) Oral daily    MEDICATIONS  (PRN):  ondansetron Injectable 4 milliGRAM(s) IV Push once PRN Nausea and/or Vomiting      Vital Signs Last 24 Hrs  T(C): 36.6 (2023 11:00), Max: 37 (2023 06:09)  T(F): 97.9 (2023 11:00), Max: 98.6 (2023 06:09)  HR: 67 (2023 12:00) (61 - 81)  BP: 104/49 (2023 12:00) (96/46 - 127/76)  BP(mean): 63 (2023 12:00) (57 - 66)  RR: 12 (2023 12:00) (12 - 20)  SpO2: 93% (2023 12:00) (93% - 98%)    Parameters below as of 2023 12:00  Patient On (Oxygen Delivery Method): room air      CAPILLARY BLOOD GLUCOSE        I&O's Summary    2023 07:01  -  2023 12:19  --------------------------------------------------------  IN: 725 mL / OUT: 175 mL / NET: 550 mL        PHYSICAL EXAM:  GENERAL: NAD, well-developed  HEAD:  Atraumatic, Normocephalic  EYES: EOMI, PERRLA, conjunctiva and sclera clear  NECK: Supple, No JVD  CHEST/LUNG: Clear to auscultation bilaterally; No wheeze  HEART: Regular rate and rhythm; No murmurs, rubs, or gallops  ABDOMEN: Soft, incisional tenderness, wounds clean   : duran in place  EXTREMITIES:  2+ Peripheral Pulses, No clubbing, cyanosis, or edema  PSYCH: AAOx3  NEUROLOGY: non-focal  SKIN: No rashes or lesions    LABS:    < from: CT Chest No Cont (23 @ 09:43) >    Stable postsurgical changes in both lungs and stable 6 mm left lower lobe   nodule.          Microbiology     RADIOLOGY & ADDITIONAL TESTS:    Imaging Personally Reviewed:    Consultant(s) Notes Reviewed:      Care Discussed with Consultants/Other Providers:   Taylor Miller MD  Pager [phone]    HPI:  72 y.o. female with h/o HTN, HLD, polio in childhood, hypothyroidism, lung ca s/p L lobectomy, gout, stable pancreatic mass/cyst, right renal mass, admitted for right partial nephrectomy on 23.    Patient seen postop in pacu, hemodynamically stable, pain controlled, denies chest pain/sob/dizziness.     PAST MEDICAL & SURGICAL HISTORY:  Dyslipidemia      HTN (hypertension)      Hypothyroidism      Obesity      Diverticulosis      h/o Polio in childhood  RLE weakness, uses cane to ambulate      Chronic gout      Spinal stenosis      S/P lumbar laminectomy      Arthritis      Spondylolisthesis      History of macular degeneration      Solitary pulmonary nodule      Lung cancer  left      Thyroid nodule      GERD (gastroesophageal reflux disease)      s/p right knee arthroscopy for meniscus tear  ,       S/P bilateral salpingo-oophorectomy    secondary to ovarian cyst      S/P D&C  2004      S/P  Section  ,       surgical repair of left leg growth plate  secondary to polio        surgical repair of right foot  secondary to polio        H/O partial thyroidectomy        H/O lumbar L5-L6 laminectomy  2003      umbilical Hernia repair with mesh  2010      S/P TKR (total knee replacement)  Left       S/P cataract surgery  2014      History of lung surgery  LULobectomy 2020          Review of Systems:   CONSTITUTIONAL: No fever, weight loss, or fatigue  EYES: No eye pain, visual disturbances, or discharge  ENMT:  No difficulty hearing, tinnitus, vertigo; No sinus or throat pain  NECK: No pain or stiffness  BREASTS: No pain, masses, or nipple discharge  RESPIRATORY: No cough, wheezing, chills or hemoptysis; No shortness of breath  CARDIOVASCULAR: No chest pain, palpitations, dizziness, or leg swelling  GASTROINTESTINAL: No abdominal or epigastric pain. No nausea, vomiting, or hematemesis; No diarrhea or constipation. No melena or hematochezia.  GENITOURINARY: No dysuria, frequency, hematuria, or incontinence  NEUROLOGICAL: No headaches, memory loss, loss of strength, numbness, or tremors  SKIN: No itching, burning, rashes, or lesions   LYMPH NODES: No enlarged glands  ENDOCRINE: No heat or cold intolerance; No hair loss  MUSCULOSKELETAL: back pain, difficulty walking, right leg weakness   PSYCHIATRIC: No depression, anxiety, mood swings, or difficulty sleeping  HEME/LYMPH: No easy bruising, or bleeding gums  ALLERY AND IMMUNOLOGIC: No hives or eczema    Allergies    oxycodone (Vomiting; Nausea)    Intolerances        Social History: former smoker 1 ppd x15 yrs, quit , 1-2 drinks wine 2-4 times/month    FAMILY HISTORY:  FH: type 2 diabetes  mother        Home Medications:  amLODIPine 10 mg oral tablet: 1 tab(s) orally once a day (in the morning) (:44)  Atenolol 75 mg once a day at bedtime:  (44)  atorvastatin 10 mg oral tablet: 1 tab(s) orally once a day (at bedtime) ()  calcium: 600 milligram(s) orally once a day ()  Centrum 1 tab daily:  ()  levothyroxine 75 mcg (0.075 mg) oral tablet: 1 tab(s) orally 2 times a week AM ()  levothyroxine 88 mcg (0.088 mg) oral tablet: 1 tab(s) orally 5 times a week AM (44)  losartan 100 mg oral tablet: 1 tab(s) orally once a day (in the morning) (:44)  Omega-3 1000 mg oral capsule: 2 cap(s) orally 2 times a day ()  pantoprazole 20 mg oral delayed release tablet: 1 tab(s) orally once a day (in the morning) (:44)  spironolactone 25 mg oral tablet: 1 tab(s) orally once a day (in the morning) (44)  Vitamin C 1 tab daily:  (:)  Vitamin D3 1000 intl units (25 mcg) oral tablet: 1 tab(s) orally once a day ()  Zinc 1 tab daily:  (44)      MEDICATIONS  (STANDING):  acetaminophen   IVPB .. 1000 milliGRAM(s) IV Intermittent every 6 hours  atenolol  Tablet 75 milliGRAM(s) Oral daily  atorvastatin 10 milliGRAM(s) Oral at bedtime  cholecalciferol 1000 Unit(s) Oral daily  heparin   Injectable 5000 Unit(s) SubCutaneous three times a day  ketorolac   Injectable 15 milliGRAM(s) IV Push every 6 hours  lactated ringers. 1000 milliLiter(s) (125 mL/Hr) IV Continuous <Continuous>  lactated ringers. 1000 milliLiter(s) (30 mL/Hr) IV Continuous <Continuous>  levothyroxine 75 MICROGram(s) Oral daily  levothyroxine 88 MICROGram(s) Oral daily  losartan 25 milliGRAM(s) Oral daily  omega-3-Acid Ethyl Esters 2 Gram(s) Oral two times a day  pantoprazole    Tablet 40 milliGRAM(s) Oral before breakfast  spironolactone 25 milliGRAM(s) Oral daily    MEDICATIONS  (PRN):  ondansetron Injectable 4 milliGRAM(s) IV Push once PRN Nausea and/or Vomiting      Vital Signs Last 24 Hrs  T(C): 36.6 (2023 11:00), Max: 37 (2023 06:09)  T(F): 97.9 (2023 11:00), Max: 98.6 (2023 06:09)  HR: 67 (2023 12:00) (61 - 81)  BP: 104/49 (2023 12:00) (96/46 - 127/76)  BP(mean): 63 (2023 12:00) (57 - 66)  RR: 12 (2023 12:00) (12 - 20)  SpO2: 93% (2023 12:00) (93% - 98%)    Parameters below as of 2023 12:00  Patient On (Oxygen Delivery Method): room air      CAPILLARY BLOOD GLUCOSE        I&O's Summary    2023 07:01  -  2023 12:19  --------------------------------------------------------  IN: 725 mL / OUT: 175 mL / NET: 550 mL        PHYSICAL EXAM:  GENERAL: NAD, well-developed  HEAD:  Atraumatic, Normocephalic  EYES: EOMI, PERRLA, conjunctiva and sclera clear  NECK: Supple, No JVD  CHEST/LUNG: Clear to auscultation bilaterally; No wheeze  HEART: Regular rate and rhythm; No murmurs, rubs, or gallops  ABDOMEN: Soft, incisional tenderness, wounds clean   : duran in place  EXTREMITIES:  2+ Peripheral Pulses, No clubbing, cyanosis, or edema  PSYCH: AAOx3  NEUROLOGY: chronic RLE weakness  SKIN: No rashes or lesions    LABS:    < from: CT Chest No Cont (23 @ 09:43) >    Stable postsurgical changes in both lungs and stable 6 mm left lower lobe   nodule.          Microbiology     RADIOLOGY & ADDITIONAL TESTS:    Imaging Personally Reviewed:    Consultant(s) Notes Reviewed:      Care Discussed with Consultants/Other Providers:

## 2023-06-29 ENCOUNTER — TRANSCRIPTION ENCOUNTER (OUTPATIENT)
Age: 73
End: 2023-06-29

## 2023-06-29 VITALS
RESPIRATION RATE: 18 BRPM | TEMPERATURE: 99 F | HEART RATE: 88 BPM | DIASTOLIC BLOOD PRESSURE: 58 MMHG | OXYGEN SATURATION: 99 % | SYSTOLIC BLOOD PRESSURE: 125 MMHG

## 2023-06-29 LAB
ANION GAP SERPL CALC-SCNC: 13 MMOL/L — SIGNIFICANT CHANGE UP (ref 7–14)
BASOPHILS # BLD AUTO: 0.01 K/UL — SIGNIFICANT CHANGE UP (ref 0–0.2)
BASOPHILS NFR BLD AUTO: 0.1 % — SIGNIFICANT CHANGE UP (ref 0–2)
BUN SERPL-MCNC: 9 MG/DL — SIGNIFICANT CHANGE UP (ref 7–23)
CALCIUM SERPL-MCNC: 9.2 MG/DL — SIGNIFICANT CHANGE UP (ref 8.4–10.5)
CHLORIDE SERPL-SCNC: 101 MMOL/L — SIGNIFICANT CHANGE UP (ref 98–107)
CO2 SERPL-SCNC: 23 MMOL/L — SIGNIFICANT CHANGE UP (ref 22–31)
CREAT SERPL-MCNC: 0.71 MG/DL — SIGNIFICANT CHANGE UP (ref 0.5–1.3)
EGFR: 89 ML/MIN/1.73M2 — SIGNIFICANT CHANGE UP
EOSINOPHIL # BLD AUTO: 0 K/UL — SIGNIFICANT CHANGE UP (ref 0–0.5)
EOSINOPHIL NFR BLD AUTO: 0 % — SIGNIFICANT CHANGE UP (ref 0–6)
GLUCOSE SERPL-MCNC: 123 MG/DL — HIGH (ref 70–99)
HCT VFR BLD CALC: 36.1 % — SIGNIFICANT CHANGE UP (ref 34.5–45)
HGB BLD-MCNC: 12.5 G/DL — SIGNIFICANT CHANGE UP (ref 11.5–15.5)
IANC: 8.82 K/UL — HIGH (ref 1.8–7.4)
IMM GRANULOCYTES NFR BLD AUTO: 0.4 % — SIGNIFICANT CHANGE UP (ref 0–0.9)
LYMPHOCYTES # BLD AUTO: 1.38 K/UL — SIGNIFICANT CHANGE UP (ref 1–3.3)
LYMPHOCYTES # BLD AUTO: 11.7 % — LOW (ref 13–44)
MCHC RBC-ENTMCNC: 29.7 PG — SIGNIFICANT CHANGE UP (ref 27–34)
MCHC RBC-ENTMCNC: 34.6 GM/DL — SIGNIFICANT CHANGE UP (ref 32–36)
MCV RBC AUTO: 85.7 FL — SIGNIFICANT CHANGE UP (ref 80–100)
MONOCYTES # BLD AUTO: 1.53 K/UL — HIGH (ref 0–0.9)
MONOCYTES NFR BLD AUTO: 13 % — SIGNIFICANT CHANGE UP (ref 2–14)
NEUTROPHILS # BLD AUTO: 8.82 K/UL — HIGH (ref 1.8–7.4)
NEUTROPHILS NFR BLD AUTO: 74.8 % — SIGNIFICANT CHANGE UP (ref 43–77)
NRBC # BLD: 0 /100 WBCS — SIGNIFICANT CHANGE UP (ref 0–0)
NRBC # FLD: 0 K/UL — SIGNIFICANT CHANGE UP (ref 0–0)
PLATELET # BLD AUTO: 216 K/UL — SIGNIFICANT CHANGE UP (ref 150–400)
POTASSIUM SERPL-MCNC: 3.7 MMOL/L — SIGNIFICANT CHANGE UP (ref 3.5–5.3)
POTASSIUM SERPL-SCNC: 3.7 MMOL/L — SIGNIFICANT CHANGE UP (ref 3.5–5.3)
RBC # BLD: 4.21 M/UL — SIGNIFICANT CHANGE UP (ref 3.8–5.2)
RBC # FLD: 12.9 % — SIGNIFICANT CHANGE UP (ref 10.3–14.5)
SODIUM SERPL-SCNC: 137 MMOL/L — SIGNIFICANT CHANGE UP (ref 135–145)
WBC # BLD: 11.79 K/UL — HIGH (ref 3.8–10.5)
WBC # FLD AUTO: 11.79 K/UL — HIGH (ref 3.8–10.5)

## 2023-06-29 PROCEDURE — 99232 SBSQ HOSP IP/OBS MODERATE 35: CPT

## 2023-06-29 RX ORDER — SODIUM CHLORIDE 9 MG/ML
1000 INJECTION, SOLUTION INTRAVENOUS
Refills: 0 | Status: DISCONTINUED | OUTPATIENT
Start: 2023-06-29 | End: 2023-06-29

## 2023-06-29 RX ORDER — POLYETHYLENE GLYCOL 3350 17 G/17G
17 POWDER, FOR SOLUTION ORAL
Qty: 0 | Refills: 0 | DISCHARGE

## 2023-06-29 RX ORDER — ACETAMINOPHEN 500 MG
3 TABLET ORAL
Qty: 0 | Refills: 0 | DISCHARGE

## 2023-06-29 RX ORDER — IBUPROFEN 200 MG
2 TABLET ORAL
Qty: 0 | Refills: 0 | DISCHARGE

## 2023-06-29 RX ADMIN — Medication 15 MILLIGRAM(S): at 00:00

## 2023-06-29 RX ADMIN — Medication 88 MICROGRAM(S): at 05:23

## 2023-06-29 RX ADMIN — SODIUM CHLORIDE 125 MILLILITER(S): 9 INJECTION, SOLUTION INTRAVENOUS at 02:46

## 2023-06-29 RX ADMIN — Medication 15 MILLIGRAM(S): at 05:35

## 2023-06-29 RX ADMIN — Medication 400 MILLIGRAM(S): at 02:45

## 2023-06-29 RX ADMIN — Medication 1000 MILLIGRAM(S): at 03:05

## 2023-06-29 RX ADMIN — HEPARIN SODIUM 5000 UNIT(S): 5000 INJECTION INTRAVENOUS; SUBCUTANEOUS at 05:17

## 2023-06-29 RX ADMIN — Medication 10 MILLIGRAM(S): at 05:16

## 2023-06-29 RX ADMIN — Medication 400 MILLIGRAM(S): at 09:26

## 2023-06-29 RX ADMIN — Medication 15 MILLIGRAM(S): at 05:17

## 2023-06-29 RX ADMIN — Medication 1000 MILLIGRAM(S): at 10:15

## 2023-06-29 RX ADMIN — PANTOPRAZOLE SODIUM 40 MILLIGRAM(S): 20 TABLET, DELAYED RELEASE ORAL at 05:17

## 2023-06-29 RX ADMIN — Medication 2 GRAM(S): at 05:24

## 2023-06-29 RX ADMIN — AMLODIPINE BESYLATE 10 MILLIGRAM(S): 2.5 TABLET ORAL at 05:23

## 2023-06-29 NOTE — DISCHARGE NOTE NURSING/CASE MANAGEMENT/SOCIAL WORK - PATIENT PORTAL LINK FT
You can access the FollowMyHealth Patient Portal offered by [facility] by registering at the following website: [email or web address]. By joining [facility]’s FollowMyHealth portal, you will also be able to view your health information using other applications (apps) compatible with our system.

## 2023-06-29 NOTE — DISCHARGE NOTE PROVIDER - HOSPITAL COURSE
71 yo F underwent uncomplicated right lap partial nephrectomy on 6/28/2023.  Postoperative course uneventful, successful TOV on POD #1,  pain controlled, ambulating.  Return of GI function on POD #1, diet advanced without incident.   Pt d/c on POD #1 to f/u with Dr. Walters.  I-stop checked.   73 yo F underwent uncomplicated right lap partial nephrectomy on 6/28/2023.  Postoperative course uneventful, successful TOV on POD #1,  pain controlled, ambulating.  Return of GI function on POD #1, diet advanced without incident.   Pt d/c on POD #1 to f/u with Dr. Walters.     73 yo F underwent uncomplicated right lap partial nephrectomy on 6/28/2023.  Postoperative course uneventful, successful TOV on POD #1,  pain controlled, ambulating.  Return of GI function on POD #1, diet advanced without incident.   Pt d/c on POD #1 to f/u with Dr. Walters.    Dr. Miller called pt to have her take 1/2 the dose of her losartan on d/c.

## 2023-06-29 NOTE — DISCHARGE NOTE PROVIDER - CARE PROVIDER_API CALL
Josh Walters  Urology  [address], [address]  [place] [address]  Phone: [phone]  Fax: [phone]  Follow Up Time:     [facility]  Internal Medicine  [address] [place] [address]  Phone: [phone]  Fax: [phone]  Follow Up Time:   
Yes

## 2023-06-29 NOTE — PROGRESS NOTE ADULT - PROBLEM SELECTOR PLAN 1
-s/p right lap partial nephrectomy on 6/28/23  -postop management per   -am labs reviewed, reactive leukocytosis WBC 11.7, H/H stable 12.5/36.1, Cr 0.7  -Martin removed, passed tov, doing well, dc home today per primary team

## 2023-06-29 NOTE — DISCHARGE NOTE PROVIDER - NSDCCPCAREPLAN_GEN_ALL_CORE_FT
PRINCIPAL DISCHARGE DIAGNOSIS  Diagnosis: Right renal mass  Assessment and Plan of Treatment: Keep well hydrated.  No heavy lifting (greater than 10 pounds) or straining for 4 to 6 weeks.  You may shower, just pat white strips dry, they will fall off in a few weeks.   Dr. Walters's office will call you  to schedule a follow up appointment.  Call the office if you have fever greater than 101, difficulty urinating, your urine becomes bloody, pain not relieved with pain medication, nausea/vomiting.        SECONDARY DISCHARGE DIAGNOSES  Diagnosis: HTN (hypertension)  Assessment and Plan of Treatment: Continue current home medications and follow up with your primary care provider      Diagnosis: GERD (gastroesophageal reflux disease)  Assessment and Plan of Treatment: Continue current home medications and follow up with your primary care provider      Diagnosis: Hyperlipidemia  Assessment and Plan of Treatment: Continue current home medications and follow up with your primary care provider      Diagnosis: Hypothyroidism  Assessment and Plan of Treatment: Continue current home medications and follow up with your primary care provider

## 2023-06-29 NOTE — DISCHARGE NOTE NURSING/CASE MANAGEMENT/SOCIAL WORK - NSDCPEFALRISK_GEN_ALL_CORE
For information on Fall & Injury Prevention, visit: [email or web address] OR  [email or web address] OR  https://www.cdc.gov/steadi/patient.html

## 2023-06-29 NOTE — PROGRESS NOTE ADULT - ASSESSMENT
stable s/p R. lap partial neph  Plan:  - OOB  - AM labs  - clears until flatus
72 F s/p right lap partial nephrectomy on 6/28/23 for renal mass.     DC home today   Follow up in clinic in 1 month
72 y.o. female with h/o HTN, HLD, polio in childhood with chronic RLE weakness, hypothyroidism, lung ca s/p L lobectomy, gout, stable pancreatic mass/cyst, right renal mass, s/p right lap partial nephrectomy on 6/28/23.

## 2023-06-29 NOTE — PROGRESS NOTE ADULT - PROBLEM SELECTOR PLAN 3
c/w home meds, amlodipine, losartan, atenolol, spironolactone with holding parameters.  losartan 25 mg is enough rather than home dose 100mg

## 2023-06-29 NOTE — PROGRESS NOTE ADULT - SUBJECTIVE AND OBJECTIVE BOX
Subjective  s/p right lap partial nephrectomy on 6/28/23  No overnight events. Patient in good spirits. Pain well controlled. Martin removed earlier this morning, now urinating also passing gas. Denies nausea, vomiting.     Objective    Vital signs  T(F): , Max: 98 (06-29-23 @ 05:35)  HR: 81 (06-29-23 @ 05:35)  BP: 118/50 (06-29-23 @ 05:35)  SpO2: 98% (06-29-23 @ 05:35)  Wt(kg): --    Output     06-28 @ 07:01  -  06-29 @ 07:00  --------------------------------------------------------  IN: 725 mL / OUT: 3775 mL / NET: -3050 mL        Gen: awake alert nad a&ox3  Abd: soft, appropriately tender  : bladder not palpable    Labs      06-29 @ 05:45    WBC 11.79 / Hct 36.1  / SCr 0.71       
Post op check    This 73 yo F is s/p R. lap partial neph  PMH: gout; chol; HTN; hypothyroid; lung Ca  Pt is awake and alert  Has no c/o  Afeb 106/53 71 96%RA    Abd- soft; appropriately tender             wounds C&D  Martin clear 175cc  
Dr. Taylor Miller  Pager [phone]    PROGRESS NOTE:     Patient is a 72y old  Female who presents with a chief complaint of Partial removal of right kidney (29 Jun 2023 09:46)      SUBJECTIVE / OVERNIGHT EVENTS: denies chest pain or sob   ADDITIONAL REVIEW OF SYSTEMS: afebrile , pain controlled, passed tov     MEDICATIONS  (STANDING):  amLODIPine   Tablet 10 milliGRAM(s) Oral daily  atenolol  Tablet 75 milliGRAM(s) Oral daily  atorvastatin 10 milliGRAM(s) Oral at bedtime  cholecalciferol 1000 Unit(s) Oral daily  heparin   Injectable 5000 Unit(s) SubCutaneous three times a day  ketorolac   Injectable 15 milliGRAM(s) IV Push every 6 hours  levothyroxine 88 MICROGram(s) Oral daily  losartan 25 milliGRAM(s) Oral daily  omega-3-Acid Ethyl Esters 2 Gram(s) Oral two times a day  pantoprazole    Tablet 40 milliGRAM(s) Oral before breakfast  spironolactone 25 milliGRAM(s) Oral daily    MEDICATIONS  (PRN):      CAPILLARY BLOOD GLUCOSE        I&O's Summary    28 Jun 2023 07:01  -  29 Jun 2023 07:00  --------------------------------------------------------  IN: 725 mL / OUT: 3775 mL / NET: -3050 mL    29 Jun 2023 07:01  -  29 Jun 2023 11:30  --------------------------------------------------------  IN: 0 mL / OUT: 300 mL / NET: -300 mL        PHYSICAL EXAM:  Vital Signs Last 24 Hrs  T(C): 37.1 (29 Jun 2023 09:29), Max: 37.1 (29 Jun 2023 09:29)  T(F): 98.7 (29 Jun 2023 09:29), Max: 98.7 (29 Jun 2023 09:29)  HR: 88 (29 Jun 2023 09:29) (66 - 97)  BP: 125/58 (29 Jun 2023 09:29) (100/45 - 125/58)  BP(mean): 59 (28 Jun 2023 12:30) (59 - 66)  RR: 18 (29 Jun 2023 09:29) (12 - 18)  SpO2: 99% (29 Jun 2023 09:29) (93% - 99%)    Parameters below as of 29 Jun 2023 09:29  Patient On (Oxygen Delivery Method): room air      GENERAL: NAD, well-developed  HEAD:  Atraumatic, Normocephalic  EYES: EOMI, PERRLA, conjunctiva and sclera clear  NECK: Supple, No JVD  CHEST/LUNG: Clear to auscultation bilaterally; No wheeze  HEART: Regular rate and rhythm; No murmurs, rubs, or gallops  ABDOMEN: Soft, incisional tenderness, wounds clean   : duran removed  EXTREMITIES:  2+ Peripheral Pulses, No clubbing, cyanosis, or edema  PSYCH: AAOx3  NEUROLOGY: chronic RLE weakness  SKIN: No rashes or lesions    LABS:                        12.5   11.79 )-----------( 216      ( 29 Jun 2023 05:45 )             36.1     06-29    137  |  101  |  9   ----------------------------<  123<H>  3.7   |  23  |  0.71    Ca    9.2      29 Jun 2023 05:45            Urinalysis Basic - ( 29 Jun 2023 05:45 )    Color: x / Appearance: x / SG: x / pH: x  Gluc: 123 mg/dL / Ketone: x  / Bili: x / Urobili: x   Blood: x / Protein: x / Nitrite: x   Leuk Esterase: x / RBC: x / WBC x   Sq Epi: x / Non Sq Epi: x / Bacteria: x          RADIOLOGY & ADDITIONAL TESTS:  Results Reviewed:   Imaging Personally Reviewed:  Electrocardiogram Personally Reviewed:    COORDINATION OF CARE:  Care Discussed with Consultants/Other Providers [Y/N]: elvia Fonseca dc home today, tov  Prior or Outpatient Records Reviewed [Y/N]:

## 2023-06-29 NOTE — DISCHARGE NOTE PROVIDER - NSDCMRMEDTOKEN_GEN_ALL_CORE_FT
acetaminophen 325 mg oral tablet: 3 tab(s) orally every 6 hours as needed for pain, alternate with ibuprofen  amLODIPine 10 mg oral tablet: 1 tab(s) orally once a day (in the morning)  Atenolol 75 mg once a day at bedtime:   atorvastatin 10 mg oral tablet: 1 tab(s) orally once a day (at bedtime)  calcium: 600 milligram(s) orally once a day  Centrum 1 tab daily:   ibuprofen 200 mg oral tablet: 2 tab(s) orally every 6 hours as needed for pain, alternate with acetaminophen  levothyroxine 75 mcg (0.075 mg) oral tablet: 1 tab(s) orally 2 times a week AM  levothyroxine 88 mcg (0.088 mg) oral tablet: 1 tab(s) orally 5 times a week AM  losartan 100 mg oral tablet: 1 tab(s) orally once a day (in the morning)  Omega-3 1000 mg oral capsule: 2 cap(s) orally 2 times a day  pantoprazole 20 mg oral delayed release tablet: 1 tab(s) orally once a day (in the morning)  polyethylene glycol 3350 oral powder for reconstitution: 17 orally once a day as needed for constipation  spironolactone 25 mg oral tablet: 1 tab(s) orally once a day (in the morning)  Vitamin C 1 tab daily:   Vitamin D3 1000 intl units (25 mcg) oral tablet: 1 tab(s) orally once a day  Zinc 1 tab daily:

## 2023-06-30 PROBLEM — K21.9 GASTRO-ESOPHAGEAL REFLUX DISEASE WITHOUT ESOPHAGITIS: Chronic | Status: ACTIVE | Noted: 2023-06-20

## 2023-07-06 LAB — SURGICAL PATHOLOGY STUDY: SIGNIFICANT CHANGE UP

## 2023-07-06 NOTE — ASU PATIENT PROFILE, ADULT - BLOOD AVOIDANCE/RESTRICTIONS, PROFILE
Office Consent to Operation/Invasive Procedure    Name: Deysi Farrar   YOB: 1945   MRN: [id number]   AUTHORIZATION:  I authorize performance on the patient of the following surgical operation/invasive procedure under the direction, supervision and authority of Brody Stone MD.  Description of operation(s): injection of 1 syringe synvisc into each knee bilaterally per week for osteoarthritis     NATURE OF TREATMENT:  The nature of my condition, the nature and purpose of the operation/procedure, possible alternative methods of treatment, risks involved, and the possibility of complications have been explained to me. I have had an opportunity to discuss this operation/procedure with the physician and other doctors concerned, and I have received answers to all questions I asked and do hereby assume all risks involved. No guarantee or assurance has been given to me by anyone as to the results that may be obtained.     ADDITIONAL PROCEDURES:  I consent to the performance of operations and procedures in addition to or different from those now contemplated, whether or not arising from presently unforeseen conditions, which the above-named doctor or his/her associate or assistants may consider necessary or advisable in the course of the operation.    OTHER QUALIFIED PRACTITIONERS:  I have been advised, and I agree, that the operation/procedure may be performed by a team of doctors including one or more attending, doctors, residents and medical students. I consent to these other qualified medical practitioners, who are not physicians, performing important parts of the operation/procedure or the administration of anesthetic agents.    ANESTHETIC AGENTS: I understand that anesthetic agents may have serious and even fatal complications. I consent to the administration of such anesthetics/sedatives/medications as may be considered necessary or advisable by the physician responsible for the service.       OBSERVATION:  For the purpose of advancing the educational programs of the facility I consent to the admittance of qualified observers.    PHOTOGRAPHY: I consent to the taking and publication of photographs and other reproductions in the course of the operation or procedure for the purpose of advancing education provided that the identify of the patient is not revealed.    TISSUE USE:  I authorize the facility to preserve and use for scientific or teaching purposes or otherwise dispose of any dismembered tissue resulting from the procedure and treatment authorized above.    INDEPENDENT PHYSICIAN SERVICES: I have been informed and I acknowledge and fully understand that the Physician(s) providing services to me in this facility, such as my personal Physician(s),  Specialty Physician(s), Radiology, Pathology, consulting, and other allied health physicians, are independent contractors and are not employees or agents of this facility, unless otherwise specifically stated. My decision to seek medical care at the facility is not based upon any understanding, representation, advertisement, media campaign, inference, implication or reliance that the physicians who are or will be treating me are employees or agents of the facility.     DO NOT SIGN IF YOU HAVE ANY QUESTIONS  ________________________________________________________________________________________________________________  My signature below constitutes my acknowledgement and agreement that I have read and understand the above, was given an opportunity to discuss this form and ask questions, that all questions were answered to my satisfaction, and I am satisfied I understand the form's contents and significance.    Date: __________________ Time: _________________ Signed: ______________________________________________________        Patient/Legally Authorized Representative (Knik appropriate)     Date: __________________   Time: _________________   Signed:  ______________________________________________________  Witness   Certificate of Interpretation:  I certify that I have read the foregoing to the signor hearof in the ____________________________________________________ language.     Date: ________________ Time: __________________ Signed: _________________________________________________________     Affirmation by Responsible Physician  I have informed the above named patient or Legally Authorized Representative of the medical condition requiring surgical treatment and/or the further diagnostic procedures referred to above. I have explained, consistent with accepted medical judgment, the nature and purposes of the treatment or procedures, the reasonable (1) possible alternatives (2) risks/complications and (3) consequences in the treatment or procedure consented to. I have also given the opportunity to ask questions and have answered any such questions.     Date:________________ Time: __________________ Signed: _________________________________________________________      none

## 2023-07-19 ENCOUNTER — APPOINTMENT (OUTPATIENT)
Dept: UROLOGY | Facility: CLINIC | Age: 73
End: 2023-07-19
Payer: MEDICARE

## 2023-07-19 VITALS
SYSTOLIC BLOOD PRESSURE: 134 MMHG | BODY MASS INDEX: 35.44 KG/M2 | WEIGHT: 200 LBS | HEIGHT: 63 IN | RESPIRATION RATE: 17 BRPM | HEART RATE: 80 BPM | TEMPERATURE: 97.7 F | DIASTOLIC BLOOD PRESSURE: 74 MMHG

## 2023-07-19 PROCEDURE — 99024 POSTOP FOLLOW-UP VISIT: CPT

## 2023-07-19 NOTE — PHYSICAL EXAM
[General Appearance - Well Developed] : well developed [General Appearance - Well Nourished] : well nourished [Abdomen Soft] : soft [Skin Color & Pigmentation] : normal skin color and pigmentation [Heart Rate And Rhythm] : Heart rate and rhythm were normal [] : no respiratory distress [Oriented To Time, Place, And Person] : oriented to person, place, and time [No Focal Deficits] : no focal deficits

## 2023-07-19 NOTE — HISTORY OF PRESENT ILLNESS
[FreeTextEntry1] : History of a right renal mass Laparoscopic right partial nephrectomy done 6/28/23 She is here for post operative check

## 2023-07-19 NOTE — ASSESSMENT
[FreeTextEntry1] : Aarti is doing well Steri strip are off . Wounds healing well . Her appetite , GI function have returned . She is feeling well .\par We reviewed the pathology \par Pathology : RCC pT1a FG 2 \par Discussed losing weight . Hydration and post op restrictions

## 2023-07-24 ENCOUNTER — RESULT REVIEW (OUTPATIENT)
Age: 73
End: 2023-07-24

## 2023-07-24 ENCOUNTER — APPOINTMENT (OUTPATIENT)
Dept: ULTRASOUND IMAGING | Facility: IMAGING CENTER | Age: 73
End: 2023-07-24
Payer: MEDICARE

## 2023-07-24 ENCOUNTER — APPOINTMENT (OUTPATIENT)
Dept: MAMMOGRAPHY | Facility: IMAGING CENTER | Age: 73
End: 2023-07-24
Payer: MEDICARE

## 2023-07-24 ENCOUNTER — OUTPATIENT (OUTPATIENT)
Dept: OUTPATIENT SERVICES | Facility: HOSPITAL | Age: 73
LOS: 1 days | End: 2023-07-24
Payer: MEDICARE

## 2023-07-24 DIAGNOSIS — Z00.8 ENCOUNTER FOR OTHER GENERAL EXAMINATION: ICD-10-CM

## 2023-07-24 DIAGNOSIS — Z96.659 PRESENCE OF UNSPECIFIED ARTIFICIAL KNEE JOINT: Chronic | ICD-10-CM

## 2023-07-24 PROCEDURE — 77063 BREAST TOMOSYNTHESIS BI: CPT | Mod: 26

## 2023-07-24 PROCEDURE — 77063 BREAST TOMOSYNTHESIS BI: CPT

## 2023-07-24 PROCEDURE — 77067 SCR MAMMO BI INCL CAD: CPT | Mod: 26

## 2023-07-24 PROCEDURE — 76641 ULTRASOUND BREAST COMPLETE: CPT | Mod: 26,50,GY

## 2023-07-24 PROCEDURE — 77067 SCR MAMMO BI INCL CAD: CPT

## 2023-07-24 PROCEDURE — 76641 ULTRASOUND BREAST COMPLETE: CPT

## 2023-08-22 NOTE — HISTORY OF PRESENT ILLNESS
Bedside report received from night nurse at 0700. Pt seen at bedside and pt care assumed. Pt is A&Ox2, on 4L NC, and denies pain. Pt is medical and not on telemetry monitor. Pt is up x2 assist w/FWW.     Plan of care reviewed with pt, call light, phone, and personal belongings within reach. Bed alarm on, and bed in low locked position. All pt's needs met at this time.   [Patient reported mammogram was normal] : Patient reported mammogram was normal [Patient reported breast sonogram was normal] : Patient reported breast sonogram was normal [Patient reported PAP Smear was normal] : Patient reported PAP Smear was normal [Patient reported bone density results were normal] : Patient reported bone density results were normal [Patient reported colonoscopy was normal] : Patient reported colonoscopy was normal [No] : Patient does not have concerns regarding sex [FreeTextEntry1] : Pleasant 72 year old menopausal female presents for Medicare covered GYN exam. She offers no complaints and denies vaginal bleeding. Bone density and mammo/sono results were reviewed with the pt which were normal.  [Mammogramdate] : 07/22 [PapSmeardate] : 02/14 [BreastSonogramDate] : 07/22 [BoneDensityDate] : 07/22 [ColonoscopyDate] : 2019

## 2023-09-01 NOTE — DISCHARGE NOTE PROVIDER - NSDCDCMDCOMP_GEN_ALL_CORE
• This problem is acute on chronic, likely secondary to myofascial pain syndrome complicated by past trauma and mood disorder. Symptoms overall improved with OMT and LDN. There is an associated musculoskeletal component with somatic dysfunction specifically affecting the above noted regions.  • OMT was performed in order to improve biomechanical ROM, enhance circulatory and lymphatic function and optimize autonomic function.  • The treatment was tolerated well without adverse events with subjective improvement in symptoms.  • Recommendations include continuing with LDN titration as tolerated, f/u with rheumatology PRN, continuing exercise and stretching and energy-based practices, adequate hydration, and return in 2-3 weeks for reassessment.    This document is complete and the patient is ready for discharge.

## 2023-09-13 ENCOUNTER — APPOINTMENT (OUTPATIENT)
Dept: ORTHOPEDIC SURGERY | Facility: CLINIC | Age: 73
End: 2023-09-13
Payer: MEDICARE

## 2023-09-13 VITALS — HEIGHT: 63 IN | BODY MASS INDEX: 36.32 KG/M2 | WEIGHT: 205 LBS

## 2023-09-13 DIAGNOSIS — M17.11 UNILATERAL PRIMARY OSTEOARTHRITIS, RIGHT KNEE: ICD-10-CM

## 2023-09-13 DIAGNOSIS — Z96.652 PRESENCE OF LEFT ARTIFICIAL KNEE JOINT: ICD-10-CM

## 2023-09-13 DIAGNOSIS — M89.661 OSTEOPATHY AFTER POLIOMYELITIS, RIGHT LOWER LEG: ICD-10-CM

## 2023-09-13 DIAGNOSIS — B91 OSTEOPATHY AFTER POLIOMYELITIS, RIGHT LOWER LEG: ICD-10-CM

## 2023-09-13 PROCEDURE — 20610 DRAIN/INJ JOINT/BURSA W/O US: CPT | Mod: RT

## 2023-09-13 PROCEDURE — 99214 OFFICE O/P EST MOD 30 MIN: CPT | Mod: 25

## 2023-09-13 PROCEDURE — 73562 X-RAY EXAM OF KNEE 3: CPT | Mod: LT

## 2023-09-13 PROCEDURE — 73564 X-RAY EXAM KNEE 4 OR MORE: CPT | Mod: RT

## 2023-11-07 ENCOUNTER — OUTPATIENT (OUTPATIENT)
Dept: OUTPATIENT SERVICES | Facility: HOSPITAL | Age: 73
LOS: 1 days | End: 2023-11-07
Payer: MEDICARE

## 2023-11-07 ENCOUNTER — APPOINTMENT (OUTPATIENT)
Dept: RADIOLOGY | Facility: IMAGING CENTER | Age: 73
End: 2023-11-07
Payer: MEDICARE

## 2023-11-07 DIAGNOSIS — C34.91 MALIGNANT NEOPLASM OF UNSPECIFIED PART OF RIGHT BRONCHUS OR LUNG: ICD-10-CM

## 2023-11-07 DIAGNOSIS — C34.92 MALIGNANT NEOPLASM OF UNSPECIFIED PART OF LEFT BRONCHUS OR LUNG: ICD-10-CM

## 2023-11-07 DIAGNOSIS — Z98.890 OTHER SPECIFIED POSTPROCEDURAL STATES: Chronic | ICD-10-CM

## 2023-11-07 DIAGNOSIS — Z96.659 PRESENCE OF UNSPECIFIED ARTIFICIAL KNEE JOINT: Chronic | ICD-10-CM

## 2023-11-07 DIAGNOSIS — Z98.49 CATARACT EXTRACTION STATUS, UNSPECIFIED EYE: Chronic | ICD-10-CM

## 2023-11-07 PROCEDURE — 71046 X-RAY EXAM CHEST 2 VIEWS: CPT

## 2023-11-07 PROCEDURE — 71046 X-RAY EXAM CHEST 2 VIEWS: CPT | Mod: 26

## 2023-11-14 ENCOUNTER — APPOINTMENT (OUTPATIENT)
Dept: THORACIC SURGERY | Facility: CLINIC | Age: 73
End: 2023-11-14
Payer: MEDICARE

## 2023-11-14 ENCOUNTER — NON-APPOINTMENT (OUTPATIENT)
Age: 73
End: 2023-11-14

## 2023-11-14 VITALS
DIASTOLIC BLOOD PRESSURE: 77 MMHG | SYSTOLIC BLOOD PRESSURE: 154 MMHG | HEIGHT: 63 IN | BODY MASS INDEX: 36.32 KG/M2 | HEART RATE: 105 BPM | WEIGHT: 205 LBS | OXYGEN SATURATION: 96 % | RESPIRATION RATE: 17 BRPM

## 2023-11-14 PROCEDURE — 99213 OFFICE O/P EST LOW 20 MIN: CPT

## 2023-12-01 ENCOUNTER — APPOINTMENT (OUTPATIENT)
Dept: UROLOGY | Facility: CLINIC | Age: 73
End: 2023-12-01
Payer: MEDICARE

## 2023-12-01 ENCOUNTER — OUTPATIENT (OUTPATIENT)
Dept: OUTPATIENT SERVICES | Facility: HOSPITAL | Age: 73
LOS: 1 days | End: 2023-12-01
Payer: MEDICARE

## 2023-12-01 DIAGNOSIS — Z96.659 PRESENCE OF UNSPECIFIED ARTIFICIAL KNEE JOINT: Chronic | ICD-10-CM

## 2023-12-01 DIAGNOSIS — Z98.49 CATARACT EXTRACTION STATUS, UNSPECIFIED EYE: Chronic | ICD-10-CM

## 2023-12-01 DIAGNOSIS — Z98.890 OTHER SPECIFIED POSTPROCEDURAL STATES: Chronic | ICD-10-CM

## 2023-12-01 DIAGNOSIS — N28.89 OTHER SPECIFIED DISORDERS OF KIDNEY AND URETER: ICD-10-CM

## 2023-12-01 DIAGNOSIS — R35.0 FREQUENCY OF MICTURITION: ICD-10-CM

## 2023-12-01 PROCEDURE — 76775 US EXAM ABDO BACK WALL LIM: CPT | Mod: 26

## 2023-12-01 PROCEDURE — 99212 OFFICE O/P EST SF 10 MIN: CPT

## 2023-12-01 PROCEDURE — 76775 US EXAM ABDO BACK WALL LIM: CPT

## 2023-12-04 DIAGNOSIS — N28.89 OTHER SPECIFIED DISORDERS OF KIDNEY AND URETER: ICD-10-CM

## 2023-12-11 ENCOUNTER — APPOINTMENT (OUTPATIENT)
Dept: OBGYN | Facility: CLINIC | Age: 73
End: 2023-12-11
Payer: COMMERCIAL

## 2023-12-11 VITALS
HEART RATE: 82 BPM | WEIGHT: 205 LBS | DIASTOLIC BLOOD PRESSURE: 70 MMHG | SYSTOLIC BLOOD PRESSURE: 110 MMHG | BODY MASS INDEX: 36.32 KG/M2 | HEIGHT: 63 IN

## 2023-12-11 DIAGNOSIS — Z01.419 ENCOUNTER FOR GYNECOLOGICAL EXAMINATION (GENERAL) (ROUTINE) W/OUT ABNORMAL FINDINGS: ICD-10-CM

## 2023-12-11 PROCEDURE — 99397 PER PM REEVAL EST PAT 65+ YR: CPT

## 2023-12-25 PROBLEM — Z01.419 WOMEN'S ANNUAL ROUTINE GYNECOLOGICAL EXAMINATION: Status: ACTIVE | Noted: 2021-11-22

## 2023-12-25 NOTE — PHYSICAL EXAM
[FreeTextEntry7] : Red 3m [place] noted, pt reports she tried Lingaro 2 days ago and injected into the now erythematous site.   [FreeTextEntry2] : 1 cm cyst noted. [FreeTextEntry9] : confirms vaginal exam

## 2023-12-25 NOTE — END OF VISIT
[FreeTextEntry4] : This note was written by Emmy Mccarty on 12/11/2023 actively solely Patsy Alvarez M.D.  All medical record entries made by this scribe at my, Patsy Alvarez M.D direction and personally dictated by me on 12/11/2023. I have personally reviewed the chart and agree that the record reflects my personal performance of the history, physical exam, assessment, and plan.

## 2023-12-25 NOTE — HISTORY OF PRESENT ILLNESS
[FreeTextEntry1] : 73-year-old female, post-menopausal who presents for annual GYN exam. Pt reports of recent hx of renal mass, pathology showed that it was benign. Pt reports she still has trouble with her knee. Pt reports she has been well otherwise. Pt reports she does monthly breast self checks. Pt denies pelvic pain and abd pain and denies VB.  [Mammogramdate] : 07/2023 [TextBox_19] : dense breasts, repeat mammo in a year [BreastSonogramDate] : 07/2023 [PapSmeardate] : 02/2014 [BoneDensityDate] : 07/2022 [TextBox_37] : Normal bone density [ColonoscopyDate] : 2019 [FreeTextEntry2] : Not currently active

## 2023-12-25 NOTE — PLAN
[FreeTextEntry1] : RTO for 1 year annual. - Referral for mammo and sono given.  - Referral for bone density given.

## 2024-02-06 NOTE — H&P PST ADULT - OPHTHALMOLOGIC
BERNA Robles II, MD, [facility]  Vascular  Surgery  [facility] [place]     details…

## 2024-04-08 NOTE — PATIENT PROFILE ADULT - NSPROPOAPRESSUREINJURY_GEN_A_NUR
Problem: Pain  Goal: Acceptable pain level achieved/maintained at rest using appropriate pain scale for the patient  Outcome: Monitoring/Evaluating progress  Goal: Acceptable pain level achieved/maintained with activity using appropriate pain scale for the patient  Outcome: Monitoring/Evaluating progress     Problem: At Risk for Falls  Goal: Patient does not fall  Outcome: Monitoring/Evaluating progress  Goal: Patient takes action to control fall-related risks  Outcome: Monitoring/Evaluating progress     Problem: Skin Integrity Alteration  Goal: Skin remains intact with no new/deterioration of wound or pressure injury  Outcome: Monitoring/Evaluating progress     Problem: Impaired Physical Mobility  Goal: Functional status is maintained or returned to baseline during hospitalization  Outcome: Monitoring/Evaluating progress  Goal: Tolerates activity for discharge setting with no abnormal symptoms  Outcome: Monitoring/Evaluating progress     Problem: Diabetes  Goal: Achieves glycemic balance  Description: Goal is to maintain blood sugar within range with no episodes of hypoglycemia  Outcome: Monitoring/Evaluating progress  Goal: Verbalizes/demonstrates understanding of NEW diagnosis of diabetes and management  Description: Document on Patient Education Activity  Outcome: Monitoring/Evaluating progress  Goal: Verbalizes understanding of diabetes management including how to use HbA1C to evaluate status of blood sugar over time (Diabetes is NOT a new diagnosis)  Description: Diabetes Education  Outcome: Monitoring/Evaluating progress  Goal: Demonstrates ability to self-administer insulin  Description: Document on Patient Education Activity  Outcome: Monitoring/Evaluating progress      no

## 2024-04-26 NOTE — ASU PATIENT PROFILE, ADULT - ABLE TO REACH PT
Patient: Adams Mejía    Procedure Summary       Date: 04/24/24 Room / Location: [facility] OR 05 / [facility]    Anesthesia Start: 0823 Anesthesia Stop: 0916    Procedures:       Cystoscopy with Dilatation (Penis)      Insertion Suprapubic Catheter Bladder (Bladder) Diagnosis:       Urinary retention      (Urinary retention [R33.9])    Surgeons: Bradford Guerra MD Responsible Provider: Ciara Doshi MD    Anesthesia Type: general ASA Status: 2            Anesthesia Type: No value filed.    Vitals Value Taken Time   /71 04/24/24 0959   Temp 36.6 °C (97.9 °F) 04/24/24 0914   Pulse 62 04/24/24 0959   Resp 16 04/24/24 0959   SpO2 100 % 04/24/24 0959       Anesthesia Post Evaluation    Patient location during evaluation: PACU  Patient participation: complete - patient participated  Level of consciousness: awake and alert  Pain management: adequate  Multimodal analgesia pain management approach  Airway patency: patent  Cardiovascular status: hemodynamically stable and acceptable  Respiratory status: acceptable and room air  Hydration status: acceptable  Postoperative Nausea and Vomiting: none        There were no known notable events for this encounter.    
Never smoker
yes

## 2024-05-07 ENCOUNTER — OUTPATIENT (OUTPATIENT)
Dept: OUTPATIENT SERVICES | Facility: HOSPITAL | Age: 74
LOS: 1 days | End: 2024-05-07
Payer: MEDICARE

## 2024-05-07 ENCOUNTER — APPOINTMENT (OUTPATIENT)
Dept: CT IMAGING | Facility: IMAGING CENTER | Age: 74
End: 2024-05-07
Payer: MEDICARE

## 2024-05-07 DIAGNOSIS — Z96.659 PRESENCE OF UNSPECIFIED ARTIFICIAL KNEE JOINT: Chronic | ICD-10-CM

## 2024-05-07 DIAGNOSIS — Z98.890 OTHER SPECIFIED POSTPROCEDURAL STATES: Chronic | ICD-10-CM

## 2024-05-07 DIAGNOSIS — C34.92 MALIGNANT NEOPLASM OF UNSPECIFIED PART OF LEFT BRONCHUS OR LUNG: ICD-10-CM

## 2024-05-07 DIAGNOSIS — Z98.49 CATARACT EXTRACTION STATUS, UNSPECIFIED EYE: Chronic | ICD-10-CM

## 2024-05-07 PROCEDURE — 71250 CT THORAX DX C-: CPT

## 2024-05-07 PROCEDURE — 71250 CT THORAX DX C-: CPT | Mod: 26,MH

## 2024-05-14 ENCOUNTER — APPOINTMENT (OUTPATIENT)
Dept: THORACIC SURGERY | Facility: CLINIC | Age: 74
End: 2024-05-14
Payer: MEDICARE

## 2024-05-14 VITALS
BODY MASS INDEX: 35.79 KG/M2 | HEIGHT: 63 IN | DIASTOLIC BLOOD PRESSURE: 76 MMHG | SYSTOLIC BLOOD PRESSURE: 135 MMHG | HEART RATE: 87 BPM | OXYGEN SATURATION: 98 % | RESPIRATION RATE: 17 BRPM | WEIGHT: 202 LBS

## 2024-05-14 DIAGNOSIS — C34.92 MALIGNANT NEOPLASM OF UNSPECIFIED PART OF LEFT BRONCHUS OR LUNG: ICD-10-CM

## 2024-05-14 DIAGNOSIS — C34.91 MALIGNANT NEOPLASM OF UNSPECIFIED PART OF RIGHT BRONCHUS OR LUNG: ICD-10-CM

## 2024-05-14 PROCEDURE — 99213 OFFICE O/P EST LOW 20 MIN: CPT

## 2024-05-14 NOTE — HISTORY OF PRESENT ILLNESS
[FreeTextEntry1] : Ms. Gaona is a 73 year old female. She is a former smoker (20 years, 1.5 PPD, Quit 1992) w/ hx of HTN, poliomyelitis, and partial thyroidectomy. Initially presented to office in November, 2020 for surgical evaluation of enlarging Left lung nodule.  12/3/2020: s/p Flexible bronch, uniportal Lt VATS, wedge rsx of LAINE x1, lingula sparing LULobectomy, MLND; Path of LAINE wedge resection revealing Invasive adenocarcinoma, acinar predominant with papillary subtype present; 2.4 cm; G2; All margins and all (0/23) lymph nodes negative; pT1c pN0 (Stage IA3). Path of LULobectomy: Negative for malignancy; multiple meningotheliod nodules.  Now, s/p Flex bronch; uniportal Right VATS; RUL posterior segmentectomy; mediastinal and hilar lymph node dissection; intercostal nerve block on 4/22/21. Path revealing Adenocarcinoma, acinar predominant; G2; 1.1cm; All margins and LN (0/26) negative for tumor. pT1b pN0- Stage IA2. The presence of a lepidic pattern (AIS) indicates that this is a new primary adenocarcinoma of the lung.  CT chest on 10/13/21: - Few stable lung nodules for instance 6 mm left lower lobe nodule on image 50 series 2. No new lung nodule. No pleural effusion. - Stable enlargement of the left thyroid lobe  CXR on 12/10/2021: - Status post posterior RIGHT upper lobe segmentectomy. - No acute radiographic cardiopulmonary pathology.  CT Chest on 4/12/22: - Postoperative changes - Few stable lung nodules for instance 6 mm LLL (2, 49) since 9/2020. No new lung nodule  CXR on 7/11/22: chronic Rt upper zone linear fibrosis/atelectasis adjacent to the surgical suture line  CT Chest on 10/11/22: - post-op changes - Few stable lung nodules: 6 mm LLL nodule (2:49) since 9/2020. - No new lung nodule. No pleural effusion. - No thoracic adenopathy. - Prior right hemithyroidectomy.  CXR on 12/6/22: - No interval change - No radiographic evidence of active chest disease  CT Chest on 4/11/23: - Status post partial resection of RUL and lingular sparing LULobectomy with stable postsurgical changes - Stable opacity surrounding the chain sutures within the RUL - Stable 6 mm LLL nodule.  INTERVAL HX: underwent uncomplicated right lap partial nephrectomy on 6/28/2023: Clear cell renal cell carcinoma, WHO/ISUP Grade 2. Follows with Dr. Walters.   CXR on 11/7/23:  - Right hilar linear atelectasis  CT Chest on 5/7/24:  - Lingular sparing LUobectomy and right upper sublobar resection.  - Unchanged density along a staple line in the RUL (since 2022), compatible with scarring.  - Unchanged LLL 6 mm part solid nodule (2-42).  - Unchanged numerous other nodules of varying density in both lungs, mostly sub-4 mm, and some of which resemble Cheerios, for example, the left lower lobe (3-278) and present since baseline study of 4/1/2021.   Patient presents to office for follow up. Overall, feels well. Today, patient denies worsening SOB, chest pain, cough, hemoptysis, fever, chills, night sweats, lightheadedness or dizziness.

## 2024-05-14 NOTE — ASSESSMENT
[FreeTextEntry1] : Ms. Gaona is a 73 year old female. She is a former smoker (20 years, 1.5 PPD, Quit 1992) w/ hx of HTN, poliomyelitis, and partial thyroidectomy. Initially presented to office in November, 2020 for surgical evaluation of enlarging Left lung nodule.  12/3/2020: s/p Flexible bronch, uniportal Lt VATS, wedge rsx of LAINE x1, lingula sparing LULobectomy, MLND; Path of LAINE wedge resection revealing Invasive adenocarcinoma, acinar predominant with papillary subtype present; 2.4 cm; G2; All margins and all (0/23) lymph nodes negative; pT1c pN0 (Stage IA3). Path of LULobectomy: Negative for malignancy; multiple meningotheliod nodules.  Now, s/p Flex bronch; uniportal Right VATS; RUL posterior segmentectomy; mediastinal and hilar lymph node dissection; intercostal nerve block on 4/22/21. Path revealing Adenocarcinoma, acinar predominant; G2; 1.1cm; All margins and LN (0/26) negative for tumor. pT1b pN0- Stage IA2. The presence of a lepidic pattern (AIS) indicates that this is a new primary adenocarcinoma of the lung.  Patient presents today w/ follow up imaging.   I have independently reviewed the medical records and imaging at the time of this office consultation, and discussed the following interpretations with the patient: - CT Imaging with Stable findings. Discussed returning to clinic in 6 months with CXR. She is agreeable.   I have independently reviewed the medical records and imaging at the time of this office consultation, and discussed the following interpretations with the patient:  I, Dr. [facility]NOFRE [facility], personally performed the evaluation and management (E/M) services for this established patient. That E/M includes conducting the examination, assessing all new/exacerbated conditions, and establishing a new plan of care. Today, My ACP, Carmen Garcia, was here to observe my evaluation and management services for this patient to be followed going forward.

## 2024-05-14 NOTE — PHYSICAL EXAM
[] : no respiratory distress [Respiration, Rhythm And Depth] : normal respiratory rhythm and effort [Exaggerated Use Of Accessory Muscles For Inspiration] : no accessory muscle use [Auscultation Breath Sounds / Voice Sounds] : lungs were clear to auscultation bilaterally [Heart Rate And Rhythm] : heart rate was normal and rhythm regular [Examination Of The Chest] : the chest was normal in appearance [Chest Visual Inspection Thoracic Asymmetry] : no chest asymmetry [Diminished Respiratory Excursion] : normal chest expansion [2+] : left 2+ [Cervical Lymph Nodes Enlarged Posterior Bilaterally] : posterior cervical [Cervical Lymph Nodes Enlarged Anterior Bilaterally] : anterior cervical [Supraclavicular Lymph Nodes Enlarged Bilaterally] : supraclavicular [No CVA Tenderness] : no ~M costovertebral angle tenderness [No Spinal Tenderness] : no spinal tenderness [Involuntary Movements] : no involuntary movements were seen [Skin Color & Pigmentation] : normal skin color and pigmentation [Oriented To Time, Place, And Person] : oriented to person, place, and time

## 2024-07-25 ENCOUNTER — APPOINTMENT (OUTPATIENT)
Dept: RADIOLOGY | Facility: IMAGING CENTER | Age: 74
End: 2024-07-25
Payer: MEDICARE

## 2024-07-25 ENCOUNTER — APPOINTMENT (OUTPATIENT)
Dept: ULTRASOUND IMAGING | Facility: IMAGING CENTER | Age: 74
End: 2024-07-25
Payer: MEDICARE

## 2024-07-25 ENCOUNTER — RESULT REVIEW (OUTPATIENT)
Age: 74
End: 2024-07-25

## 2024-07-25 ENCOUNTER — APPOINTMENT (OUTPATIENT)
Dept: MAMMOGRAPHY | Facility: IMAGING CENTER | Age: 74
End: 2024-07-25
Payer: MEDICARE

## 2024-07-25 PROCEDURE — 77085 DXA BONE DENSITY AXL VRT FX: CPT | Mod: 26

## 2024-07-25 PROCEDURE — 77063 BREAST TOMOSYNTHESIS BI: CPT | Mod: 26

## 2024-07-25 PROCEDURE — 76641 ULTRASOUND BREAST COMPLETE: CPT | Mod: 26,50,GY

## 2024-07-25 PROCEDURE — 77067 SCR MAMMO BI INCL CAD: CPT | Mod: 26

## 2024-08-06 ENCOUNTER — APPOINTMENT (OUTPATIENT)
Dept: ORTHOPEDIC SURGERY | Facility: CLINIC | Age: 74
End: 2024-08-06

## 2024-08-06 PROBLEM — M19.071 ARTHRITIS OF ANKLE, RIGHT, DEGENERATIVE: Status: ACTIVE | Noted: 2024-08-06

## 2024-08-06 PROCEDURE — 20610 DRAIN/INJ JOINT/BURSA W/O US: CPT | Mod: RT

## 2024-08-06 PROCEDURE — 73610 X-RAY EXAM OF ANKLE: CPT | Mod: RT

## 2024-08-06 PROCEDURE — 99214 OFFICE O/P EST MOD 30 MIN: CPT | Mod: 25

## 2024-08-06 PROCEDURE — 73564 X-RAY EXAM KNEE 4 OR MORE: CPT | Mod: 50

## 2024-10-31 ENCOUNTER — APPOINTMENT (OUTPATIENT)
Dept: UROLOGY | Facility: CLINIC | Age: 74
End: 2024-10-31
Payer: MEDICARE

## 2024-10-31 ENCOUNTER — OUTPATIENT (OUTPATIENT)
Dept: OUTPATIENT SERVICES | Facility: HOSPITAL | Age: 74
LOS: 1 days | End: 2024-10-31
Payer: MEDICARE

## 2024-10-31 ENCOUNTER — NON-APPOINTMENT (OUTPATIENT)
Age: 74
End: 2024-10-31

## 2024-10-31 VITALS
TEMPERATURE: 98 F | DIASTOLIC BLOOD PRESSURE: 75 MMHG | HEART RATE: 102 BPM | RESPIRATION RATE: 17 BRPM | BODY MASS INDEX: 35.44 KG/M2 | SYSTOLIC BLOOD PRESSURE: 125 MMHG | WEIGHT: 200 LBS | HEIGHT: 63 IN

## 2024-10-31 DIAGNOSIS — N28.89 OTHER SPECIFIED DISORDERS OF KIDNEY AND URETER: ICD-10-CM

## 2024-10-31 DIAGNOSIS — Z98.49 CATARACT EXTRACTION STATUS, UNSPECIFIED EYE: Chronic | ICD-10-CM

## 2024-10-31 DIAGNOSIS — Z96.659 PRESENCE OF UNSPECIFIED ARTIFICIAL KNEE JOINT: Chronic | ICD-10-CM

## 2024-10-31 DIAGNOSIS — R35.0 FREQUENCY OF MICTURITION: ICD-10-CM

## 2024-10-31 DIAGNOSIS — Z98.890 OTHER SPECIFIED POSTPROCEDURAL STATES: Chronic | ICD-10-CM

## 2024-10-31 PROCEDURE — 76775 US EXAM ABDO BACK WALL LIM: CPT | Mod: 26

## 2024-10-31 PROCEDURE — 99213 OFFICE O/P EST LOW 20 MIN: CPT

## 2024-11-01 DIAGNOSIS — N28.89 OTHER SPECIFIED DISORDERS OF KIDNEY AND URETER: ICD-10-CM

## 2024-11-05 ENCOUNTER — APPOINTMENT (OUTPATIENT)
Dept: RADIOLOGY | Facility: IMAGING CENTER | Age: 74
End: 2024-11-05
Payer: MEDICARE

## 2024-11-05 ENCOUNTER — OUTPATIENT (OUTPATIENT)
Dept: OUTPATIENT SERVICES | Facility: HOSPITAL | Age: 74
LOS: 1 days | End: 2024-11-05
Payer: MEDICARE

## 2024-11-05 DIAGNOSIS — C34.92 MALIGNANT NEOPLASM OF UNSPECIFIED PART OF LEFT BRONCHUS OR LUNG: ICD-10-CM

## 2024-11-05 DIAGNOSIS — Z98.890 OTHER SPECIFIED POSTPROCEDURAL STATES: Chronic | ICD-10-CM

## 2024-11-05 DIAGNOSIS — Z96.659 PRESENCE OF UNSPECIFIED ARTIFICIAL KNEE JOINT: Chronic | ICD-10-CM

## 2024-11-05 DIAGNOSIS — C34.91 MALIGNANT NEOPLASM OF UNSPECIFIED PART OF RIGHT BRONCHUS OR LUNG: ICD-10-CM

## 2024-11-05 DIAGNOSIS — Z98.49 CATARACT EXTRACTION STATUS, UNSPECIFIED EYE: Chronic | ICD-10-CM

## 2024-11-05 PROCEDURE — 71046 X-RAY EXAM CHEST 2 VIEWS: CPT | Mod: 26

## 2024-11-12 ENCOUNTER — APPOINTMENT (OUTPATIENT)
Dept: THORACIC SURGERY | Facility: CLINIC | Age: 74
End: 2024-11-12
Payer: MEDICARE

## 2024-11-14 ENCOUNTER — APPOINTMENT (OUTPATIENT)
Dept: THORACIC SURGERY | Facility: CLINIC | Age: 74
End: 2024-11-14
Payer: MEDICARE

## 2024-11-14 DIAGNOSIS — C34.91 MALIGNANT NEOPLASM OF UNSPECIFIED PART OF RIGHT BRONCHUS OR LUNG: ICD-10-CM

## 2024-11-14 DIAGNOSIS — C34.92 MALIGNANT NEOPLASM OF UNSPECIFIED PART OF LEFT BRONCHUS OR LUNG: ICD-10-CM

## 2024-11-14 PROCEDURE — 99443: CPT | Mod: 93

## 2024-11-15 ENCOUNTER — APPOINTMENT (OUTPATIENT)
Dept: THORACIC SURGERY | Facility: CLINIC | Age: 74
End: 2024-11-15
Payer: MEDICARE

## 2024-11-20 PROCEDURE — 71046 X-RAY EXAM CHEST 2 VIEWS: CPT

## 2024-11-20 PROCEDURE — 76775 US EXAM ABDO BACK WALL LIM: CPT

## 2024-11-20 NOTE — H&P PST ADULT - ADMIT DATE
25-Nov-2020 Action 2: Continue Detail Level: Simple Plan: If labs are WNL, will refill x 6 months. Continue Regimen: Clobetasol ointment twice daily as needed and Taltz every 4 weeks.

## 2024-11-26 ENCOUNTER — APPOINTMENT (OUTPATIENT)
Dept: UROLOGY | Facility: CLINIC | Age: 74
End: 2024-11-26

## 2024-12-10 ENCOUNTER — APPOINTMENT (OUTPATIENT)
Dept: OBGYN | Facility: CLINIC | Age: 74
End: 2024-12-10

## 2024-12-10 VITALS
DIASTOLIC BLOOD PRESSURE: 74 MMHG | HEIGHT: 63 IN | BODY MASS INDEX: 36.14 KG/M2 | WEIGHT: 204 LBS | SYSTOLIC BLOOD PRESSURE: 134 MMHG | HEART RATE: 82 BPM

## 2024-12-10 DIAGNOSIS — N60.19 DIFFUSE CYSTIC MASTOPATHY OF UNSPECIFIED BREAST: ICD-10-CM

## 2024-12-10 PROCEDURE — G0101: CPT

## 2025-05-06 ENCOUNTER — OUTPATIENT (OUTPATIENT)
Dept: OUTPATIENT SERVICES | Facility: HOSPITAL | Age: 75
LOS: 1 days | End: 2025-05-06
Payer: MEDICARE

## 2025-05-06 ENCOUNTER — APPOINTMENT (OUTPATIENT)
Dept: CT IMAGING | Facility: IMAGING CENTER | Age: 75
End: 2025-05-06

## 2025-05-06 DIAGNOSIS — Z98.890 OTHER SPECIFIED POSTPROCEDURAL STATES: Chronic | ICD-10-CM

## 2025-05-06 DIAGNOSIS — Z96.659 PRESENCE OF UNSPECIFIED ARTIFICIAL KNEE JOINT: Chronic | ICD-10-CM

## 2025-05-06 DIAGNOSIS — C34.92 MALIGNANT NEOPLASM OF UNSPECIFIED PART OF LEFT BRONCHUS OR LUNG: ICD-10-CM

## 2025-05-06 DIAGNOSIS — C34.91 MALIGNANT NEOPLASM OF UNSPECIFIED PART OF RIGHT BRONCHUS OR LUNG: ICD-10-CM

## 2025-05-06 DIAGNOSIS — Z98.49 CATARACT EXTRACTION STATUS, UNSPECIFIED EYE: Chronic | ICD-10-CM

## 2025-05-06 PROCEDURE — 71250 CT THORAX DX C-: CPT

## 2025-05-06 PROCEDURE — 71250 CT THORAX DX C-: CPT | Mod: 26

## 2025-05-13 ENCOUNTER — NON-APPOINTMENT (OUTPATIENT)
Age: 75
End: 2025-05-13

## 2025-05-13 ENCOUNTER — APPOINTMENT (OUTPATIENT)
Dept: THORACIC SURGERY | Facility: CLINIC | Age: 75
End: 2025-05-13
Payer: MEDICARE

## 2025-05-13 VITALS
HEART RATE: 103 BPM | RESPIRATION RATE: 17 BRPM | WEIGHT: 202 LBS | DIASTOLIC BLOOD PRESSURE: 59 MMHG | BODY MASS INDEX: 35.79 KG/M2 | SYSTOLIC BLOOD PRESSURE: 129 MMHG | HEIGHT: 63 IN | OXYGEN SATURATION: 95 %

## 2025-05-13 DIAGNOSIS — C34.91 MALIGNANT NEOPLASM OF UNSPECIFIED PART OF RIGHT BRONCHUS OR LUNG: ICD-10-CM

## 2025-05-13 DIAGNOSIS — C34.92 MALIGNANT NEOPLASM OF UNSPECIFIED PART OF LEFT BRONCHUS OR LUNG: ICD-10-CM

## 2025-05-13 PROCEDURE — 99214 OFFICE O/P EST MOD 30 MIN: CPT

## 2025-05-15 DIAGNOSIS — K86.2 CYST OF PANCREAS: ICD-10-CM

## 2025-05-23 ENCOUNTER — APPOINTMENT (OUTPATIENT)
Dept: SURGICAL ONCOLOGY | Facility: CLINIC | Age: 75
End: 2025-05-23

## 2025-05-23 ENCOUNTER — APPOINTMENT (OUTPATIENT)
Dept: CT IMAGING | Facility: IMAGING CENTER | Age: 75
End: 2025-05-23
Payer: MEDICARE

## 2025-05-23 ENCOUNTER — NON-APPOINTMENT (OUTPATIENT)
Age: 75
End: 2025-05-23

## 2025-05-23 ENCOUNTER — OUTPATIENT (OUTPATIENT)
Dept: OUTPATIENT SERVICES | Facility: HOSPITAL | Age: 75
LOS: 1 days | End: 2025-05-23
Payer: MEDICARE

## 2025-05-23 VITALS
DIASTOLIC BLOOD PRESSURE: 69 MMHG | BODY MASS INDEX: 35.79 KG/M2 | HEART RATE: 96 BPM | HEIGHT: 63 IN | RESPIRATION RATE: 18 BRPM | WEIGHT: 202 LBS | SYSTOLIC BLOOD PRESSURE: 110 MMHG | OXYGEN SATURATION: 96 %

## 2025-05-23 DIAGNOSIS — K86.2 CYST OF PANCREAS: ICD-10-CM

## 2025-05-23 DIAGNOSIS — Z96.659 PRESENCE OF UNSPECIFIED ARTIFICIAL KNEE JOINT: Chronic | ICD-10-CM

## 2025-05-23 PROCEDURE — 74160 CT ABDOMEN W/CONTRAST: CPT | Mod: 26

## 2025-05-23 PROCEDURE — 99204 OFFICE O/P NEW MOD 45 MIN: CPT

## 2025-05-23 PROCEDURE — 74160 CT ABDOMEN W/CONTRAST: CPT

## 2025-06-03 NOTE — ASU PATIENT PROFILE, ADULT - SPIRITUAL CULTURAL, CURRENT SITUATION, PROFILE
SEBASTIAN met with pt in infusion by request. SEBASTIAN asked pt to explain what has happened and what SW can help with. Pt communicated she was laid off from her job, she cannot work, and has lost her health insurance. SEBASTIAN provided support and apologized for the timing and unfortunate situation. SEBASTIAN provided pt with information about [employer] to assist for one month with rent and utilities, provided her with the [employer] financial assistance application and offered to submit that along with her [employer] application once she is able to bring in her highest need bill for the application. SEBASTIAN encouraged pt to go to her local DCBS office or apply for Medicaid on the [facility] health insurance portal. SEBASTIAN also encouraged her to apply for SNAP and provided her with information on local food pantries in [place]. Pt thanked SEBASTIAN for the information and agreed to reach out as needs arise. SEBASTIAN will remain available ongoing.   
no

## 2025-07-09 NOTE — H&P PST ADULT - EXTREMITIES
Have you been to the ER, urgent care clinic since your last visit?  Hospitalized since your last visit?   NO    Have you seen or consulted any other health care providers outside our system since your last visit?   NO    Have you had a mammogram?”   NO    No breast cancer screening on file       “Have you had a colorectal cancer screening such as a colonoscopy/FIT/Cologuard?    NO    No colonoscopy on file  No cologuard on file  No FIT/FOBT on file   No flexible sigmoidoscopy on file           detailed exam

## 2025-07-28 ENCOUNTER — APPOINTMENT (OUTPATIENT)
Dept: MAMMOGRAPHY | Facility: IMAGING CENTER | Age: 75
End: 2025-07-28
Payer: MEDICARE

## 2025-07-28 ENCOUNTER — APPOINTMENT (OUTPATIENT)
Dept: ULTRASOUND IMAGING | Facility: IMAGING CENTER | Age: 75
End: 2025-07-28
Payer: MEDICARE

## 2025-07-28 ENCOUNTER — RESULT REVIEW (OUTPATIENT)
Age: 75
End: 2025-07-28

## 2025-07-28 ENCOUNTER — OUTPATIENT (OUTPATIENT)
Dept: OUTPATIENT SERVICES | Facility: HOSPITAL | Age: 75
LOS: 1 days | End: 2025-07-28
Payer: MEDICARE

## 2025-07-28 DIAGNOSIS — Z96.659 PRESENCE OF UNSPECIFIED ARTIFICIAL KNEE JOINT: Chronic | ICD-10-CM

## 2025-07-28 DIAGNOSIS — Z01.419 ENCOUNTER FOR GYNECOLOGICAL EXAMINATION (GENERAL) (ROUTINE) WITHOUT ABNORMAL FINDINGS: ICD-10-CM

## 2025-07-28 DIAGNOSIS — Z98.49 CATARACT EXTRACTION STATUS, UNSPECIFIED EYE: Chronic | ICD-10-CM

## 2025-07-28 DIAGNOSIS — Z98.890 OTHER SPECIFIED POSTPROCEDURAL STATES: Chronic | ICD-10-CM

## 2025-07-28 PROCEDURE — 76641 ULTRASOUND BREAST COMPLETE: CPT

## 2025-07-28 PROCEDURE — 77067 SCR MAMMO BI INCL CAD: CPT

## 2025-07-28 PROCEDURE — 77063 BREAST TOMOSYNTHESIS BI: CPT | Mod: 26

## 2025-07-28 PROCEDURE — 77063 BREAST TOMOSYNTHESIS BI: CPT

## 2025-07-28 PROCEDURE — 76641 ULTRASOUND BREAST COMPLETE: CPT | Mod: 26,50,GA

## 2025-07-28 PROCEDURE — 77067 SCR MAMMO BI INCL CAD: CPT | Mod: 26

## (undated) DEVICE — Device

## (undated) DEVICE — SUT VICRYL 2-0 27" SH UNDYED

## (undated) DEVICE — BLADE SURGICAL #15 CARBON

## (undated) DEVICE — SUT VICRYL 2-0 27" UR-6

## (undated) DEVICE — ELCTR GROUNDING PAD ADULT COVIDIEN

## (undated) DEVICE — SOL IRR POUR H2O 500ML

## (undated) DEVICE — TROCAR SURGIQUEST AIRSEAL 12MMX100MM

## (undated) DEVICE — TROCAR COVIDIEN VERSAONE BLADELESS FIXATION 5MM LONG

## (undated) DEVICE — PACK GENERAL LAPAROSCOPY

## (undated) DEVICE — SOL IRR BAG H2O 3000ML

## (undated) DEVICE — SUT VICRYL 0 27" UR-6

## (undated) DEVICE — TAPE SILK 3"

## (undated) DEVICE — LAP PAD 4 X 18"

## (undated) DEVICE — INSUFFLATION NDL COVIDIEN SURGINEEDLE VERESS 150MM LONG

## (undated) DEVICE — GOWN XL

## (undated) DEVICE — DRSG STERISTRIPS 0.5 X 4"

## (undated) DEVICE — SUT VICRYL 0 36" CT-1 UNDYED

## (undated) DEVICE — PREP CHLORAPREP HI-LITE ORANGE 26ML

## (undated) DEVICE — LABELS BLANK W PEN

## (undated) DEVICE — DRSG BENZOIN 0.6CC

## (undated) DEVICE — TUBING STRYKEFLOW II SUCTION / IRRIGATOR

## (undated) DEVICE — SUT VLOC 180 0 12" GS-21 GREEN

## (undated) DEVICE — TROCAR COVIDIEN VERSAONE BLUNT TIP HASSAN 12MM

## (undated) DEVICE — D HELP - CLEARVIEW CLEARIFY SYSTEM

## (undated) DEVICE — DRSG TEGADERM 2.5X3"

## (undated) DEVICE — SUT CLIP LAPRA-TY ABSORBABLE SIZE 0.118 TO 0.12" VIOLET

## (undated) DEVICE — LIGASURE ATLAS 10MM 37CM

## (undated) DEVICE — TROCAR COVIDIEN VERSAONE BLADELESS FIXATION 12MM STANDARD

## (undated) DEVICE — SHEARS COVIDIEN ENDO SHEAR 5MM X 31CM W UNIPOLAR CAUTERY

## (undated) DEVICE — RETRACTOR COVIDIEN ENDOPADDLE 12MM DISP

## (undated) DEVICE — ELCTR HANDSWITCHING 5MM ABC

## (undated) DEVICE — INSUFFLATION NDL COVIDIEN SURGINEEDLE VERESS 120MM

## (undated) DEVICE — TUBING AIRSEAL TRI-LUMEN FILTERED

## (undated) DEVICE — POSITIONER FOAM EGG CRATE ULNAR 2PCS (PINK)

## (undated) DEVICE — TROCAR COVIDIEN VERSAONE BLADED FIXATION 12MM STANDARD

## (undated) DEVICE — DRAIN RESERVOIR FOR JACKSON PRATT 100CC CARDINAL

## (undated) DEVICE — GLV 7.5 PROTEXIS (WHITE)

## (undated) DEVICE — DRSG CURITY GAUZE SPONGE 4 X 4" 12-PLY

## (undated) DEVICE — ELCTR GROUNDING PAD ADULT CONMED

## (undated) DEVICE — LIGASURE BLUNT TIP 37CM

## (undated) DEVICE — BASIN SET SINGLE

## (undated) DEVICE — SUT BIOSYN 4-0 18" P-12

## (undated) DEVICE — FOLEY TRAY 16FR 5CC LF UMETER CLOSED

## (undated) DEVICE — TROCAR COVIDIEN VERSAPORT BLADELESS OPTICAL 5MM STANDARD

## (undated) DEVICE — CANISTER DISPOSABLE THIN WALL 3000CC

## (undated) DEVICE — ENDOCATCH 10MM SPECIMEN POUCH

## (undated) DEVICE — POSITIONER STRAP ARMBOARD VELCRO TS-30

## (undated) DEVICE — DRSG TEGADERM 4X4.75"

## (undated) DEVICE — SHEARS COVIDIEN ENDO SHEAR 5MM X 45CM LONG W MONOPOLAR CAUTERY

## (undated) DEVICE — GLV 7 PROTEXIS (WHITE)

## (undated) DEVICE — SUT VICRYL 1 36" CT-1 UNDYED

## (undated) DEVICE — VENODYNE/SCD SLEEVE CALF MEDIUM

## (undated) DEVICE — SUT CAPROSYN 4-0 P-12 UNDYED